# Patient Record
Sex: MALE | Race: WHITE | Employment: OTHER | URBAN - METROPOLITAN AREA
[De-identification: names, ages, dates, MRNs, and addresses within clinical notes are randomized per-mention and may not be internally consistent; named-entity substitution may affect disease eponyms.]

---

## 2023-02-15 ENCOUNTER — OFFICE VISIT (OUTPATIENT)
Dept: INTERNAL MEDICINE | Age: 78
End: 2023-02-15
Payer: MEDICARE

## 2023-02-15 VITALS
RESPIRATION RATE: 16 BRPM | HEIGHT: 63 IN | SYSTOLIC BLOOD PRESSURE: 150 MMHG | HEART RATE: 74 BPM | WEIGHT: 147.2 LBS | OXYGEN SATURATION: 96 % | BODY MASS INDEX: 26.08 KG/M2 | DIASTOLIC BLOOD PRESSURE: 78 MMHG

## 2023-02-15 DIAGNOSIS — R97.20 ELEVATED PSA: ICD-10-CM

## 2023-02-15 DIAGNOSIS — R33.9 URINARY RETENTION: ICD-10-CM

## 2023-02-15 DIAGNOSIS — E87.5 HYPERKALEMIA: ICD-10-CM

## 2023-02-15 DIAGNOSIS — Z87.891 PERSONAL HISTORY OF TOBACCO USE: ICD-10-CM

## 2023-02-15 DIAGNOSIS — I10 PRIMARY HYPERTENSION: Primary | ICD-10-CM

## 2023-02-15 DIAGNOSIS — Z91.81 AT HIGH RISK FOR FALLS: ICD-10-CM

## 2023-02-15 DIAGNOSIS — Z11.59 NEED FOR HEPATITIS C SCREENING TEST: ICD-10-CM

## 2023-02-15 DIAGNOSIS — E78.49 OTHER HYPERLIPIDEMIA: ICD-10-CM

## 2023-02-15 PROBLEM — H16.143 PUNCTATE KERATITIS, BILATERAL: Status: ACTIVE | Noted: 2018-11-27

## 2023-02-15 PROBLEM — N18.30 CKD (CHRONIC KIDNEY DISEASE) STAGE 3, GFR 30-59 ML/MIN (HCC): Status: RESOLVED | Noted: 2019-05-24 | Resolved: 2023-02-15

## 2023-02-15 PROBLEM — H02.88A MEIBOMIAN GLAND DYSFUNCTION (MGD) OF UPPER AND LOWER LIDS OF BOTH EYES: Status: ACTIVE | Noted: 2018-11-27

## 2023-02-15 PROBLEM — H52.03 HYPEROPIA OF BOTH EYES WITH ASTIGMATISM AND PRESBYOPIA: Status: RESOLVED | Noted: 2018-11-27 | Resolved: 2023-02-15

## 2023-02-15 PROBLEM — H25.813 COMBINED FORMS OF AGE-RELATED CATARACT OF BOTH EYES: Status: ACTIVE | Noted: 2018-11-27

## 2023-02-15 PROBLEM — H52.203 HYPEROPIA OF BOTH EYES WITH ASTIGMATISM AND PRESBYOPIA: Status: ACTIVE | Noted: 2018-11-27

## 2023-02-15 PROBLEM — H52.4 HYPEROPIA OF BOTH EYES WITH ASTIGMATISM AND PRESBYOPIA: Status: RESOLVED | Noted: 2018-11-27 | Resolved: 2023-02-15

## 2023-02-15 PROBLEM — H02.88B MEIBOMIAN GLAND DYSFUNCTION (MGD) OF UPPER AND LOWER LIDS OF BOTH EYES: Status: RESOLVED | Noted: 2018-11-27 | Resolved: 2023-02-15

## 2023-02-15 PROBLEM — N18.30 CKD (CHRONIC KIDNEY DISEASE) STAGE 3, GFR 30-59 ML/MIN (HCC): Status: ACTIVE | Noted: 2019-05-24

## 2023-02-15 PROBLEM — H02.88B MEIBOMIAN GLAND DYSFUNCTION (MGD) OF UPPER AND LOWER LIDS OF BOTH EYES: Status: ACTIVE | Noted: 2018-11-27

## 2023-02-15 PROBLEM — H52.203 HYPEROPIA OF BOTH EYES WITH ASTIGMATISM AND PRESBYOPIA: Status: RESOLVED | Noted: 2018-11-27 | Resolved: 2023-02-15

## 2023-02-15 PROBLEM — H16.143 PUNCTATE KERATITIS, BILATERAL: Status: RESOLVED | Noted: 2018-11-27 | Resolved: 2023-02-15

## 2023-02-15 PROBLEM — H52.4 HYPEROPIA OF BOTH EYES WITH ASTIGMATISM AND PRESBYOPIA: Status: ACTIVE | Noted: 2018-11-27

## 2023-02-15 PROBLEM — E78.5 HYPERLIPIDEMIA: Status: ACTIVE | Noted: 2018-04-24

## 2023-02-15 PROBLEM — H52.03 HYPEROPIA OF BOTH EYES WITH ASTIGMATISM AND PRESBYOPIA: Status: ACTIVE | Noted: 2018-11-27

## 2023-02-15 PROBLEM — H02.88A MEIBOMIAN GLAND DYSFUNCTION (MGD) OF UPPER AND LOWER LIDS OF BOTH EYES: Status: RESOLVED | Noted: 2018-11-27 | Resolved: 2023-02-15

## 2023-02-15 LAB
ANION GAP SERPL CALCULATED.3IONS-SCNC: 12 MEQ/L (ref 9–15)
BUN BLDV-MCNC: 26 MG/DL (ref 8–23)
CALCIUM SERPL-MCNC: 9.7 MG/DL (ref 8.5–9.9)
CHLORIDE BLD-SCNC: 104 MEQ/L (ref 95–107)
CO2: 20 MEQ/L (ref 20–31)
CREAT SERPL-MCNC: 1.29 MG/DL (ref 0.7–1.2)
GFR SERPL CREATININE-BSD FRML MDRD: 56.5 ML/MIN/{1.73_M2}
GLUCOSE BLD-MCNC: 80 MG/DL (ref 70–99)
POTASSIUM SERPL-SCNC: 5.2 MEQ/L (ref 3.4–4.9)
SODIUM BLD-SCNC: 136 MEQ/L (ref 135–144)

## 2023-02-15 PROCEDURE — G8417 CALC BMI ABV UP PARAM F/U: HCPCS | Performed by: NURSE PRACTITIONER

## 2023-02-15 PROCEDURE — 1123F ACP DISCUSS/DSCN MKR DOCD: CPT | Performed by: NURSE PRACTITIONER

## 2023-02-15 PROCEDURE — 99204 OFFICE O/P NEW MOD 45 MIN: CPT | Performed by: NURSE PRACTITIONER

## 2023-02-15 PROCEDURE — G8427 DOCREV CUR MEDS BY ELIG CLIN: HCPCS | Performed by: NURSE PRACTITIONER

## 2023-02-15 PROCEDURE — 4004F PT TOBACCO SCREEN RCVD TLK: CPT | Performed by: NURSE PRACTITIONER

## 2023-02-15 PROCEDURE — 3077F SYST BP >= 140 MM HG: CPT | Performed by: NURSE PRACTITIONER

## 2023-02-15 PROCEDURE — 3078F DIAST BP <80 MM HG: CPT | Performed by: NURSE PRACTITIONER

## 2023-02-15 PROCEDURE — G8484 FLU IMMUNIZE NO ADMIN: HCPCS | Performed by: NURSE PRACTITIONER

## 2023-02-15 RX ORDER — AMLODIPINE BESYLATE 5 MG/1
TABLET ORAL
COMMUNITY
Start: 2023-02-08 | End: 2023-02-15 | Stop reason: SDUPTHER

## 2023-02-15 RX ORDER — ATORVASTATIN CALCIUM 40 MG/1
TABLET, FILM COATED ORAL
COMMUNITY
Start: 2023-02-08

## 2023-02-15 RX ORDER — AMLODIPINE BESYLATE 10 MG/1
TABLET ORAL
Qty: 90 TABLET | Refills: 1 | Status: SHIPPED | OUTPATIENT
Start: 2023-02-15

## 2023-02-15 RX ORDER — ASPIRIN 81 MG/1
81 TABLET ORAL DAILY
COMMUNITY

## 2023-02-15 SDOH — ECONOMIC STABILITY: FOOD INSECURITY: WITHIN THE PAST 12 MONTHS, THE FOOD YOU BOUGHT JUST DIDN'T LAST AND YOU DIDN'T HAVE MONEY TO GET MORE.: NEVER TRUE

## 2023-02-15 SDOH — ECONOMIC STABILITY: HOUSING INSECURITY
IN THE LAST 12 MONTHS, WAS THERE A TIME WHEN YOU DID NOT HAVE A STEADY PLACE TO SLEEP OR SLEPT IN A SHELTER (INCLUDING NOW)?: NO

## 2023-02-15 SDOH — ECONOMIC STABILITY: FOOD INSECURITY: WITHIN THE PAST 12 MONTHS, YOU WORRIED THAT YOUR FOOD WOULD RUN OUT BEFORE YOU GOT MONEY TO BUY MORE.: NEVER TRUE

## 2023-02-15 SDOH — ECONOMIC STABILITY: INCOME INSECURITY: HOW HARD IS IT FOR YOU TO PAY FOR THE VERY BASICS LIKE FOOD, HOUSING, MEDICAL CARE, AND HEATING?: NOT HARD AT ALL

## 2023-02-15 ASSESSMENT — ENCOUNTER SYMPTOMS
BLOOD IN STOOL: 0
SHORTNESS OF BREATH: 0
WHEEZING: 0
CHEST TIGHTNESS: 0
RESPIRATORY NEGATIVE: 1

## 2023-02-15 ASSESSMENT — PATIENT HEALTH QUESTIONNAIRE - PHQ9
SUM OF ALL RESPONSES TO PHQ QUESTIONS 1-9: 1
SUM OF ALL RESPONSES TO PHQ9 QUESTIONS 1 & 2: 1
2. FEELING DOWN, DEPRESSED OR HOPELESS: 0
1. LITTLE INTEREST OR PLEASURE IN DOING THINGS: 1
SUM OF ALL RESPONSES TO PHQ QUESTIONS 1-9: 1

## 2023-02-15 NOTE — PROGRESS NOTES
[address] [id number] [facility] PRIMARY CARE  [address] [facility] [address]  Dept: [phone]  Dept Fax: [phone]: [phone]     FOREST Browne (: 1945) is a 65 y.o. male, New patient, here for evaluation of the following chief complaint(s):  New Patient (Here to establish. Was seeing Eneida CHAVARRIA in [facility] but it was too far. ), Other (Eneida CHAVARRIA was concerned for hyperkalemia. Wants BMP.), and Urinary Retention (Sometimes having a hard time going. Sometimes is up multiple times a night going to the bathroom. Tanika wanted him to follow up with a urologist. )      PCP:  No primary care provider on file. Here today to establish. Has elevated prostate level. His urination has been irregular. Gets up at least once a night, sometimes more. Seems like if works harder during day, able to sleep through it. His flow and ability to pee are abnormal. Has not seen urology. Hypertension: Pt checks his bp at home, generally is 135/70. DBP is pretty stable. SBP can get as high as 140, 150. Has been checking bp more lately but frequency varies. Never sees 120s SBP. Takes amlodipine 5mg daily. Is a smoker. Ofelia Gordon had a lung scan few years ago at [facility], thinks might have been an Xray but not sure. Review of Systems   Constitutional: Negative. Negative for fatigue and unexpected weight change. Respiratory: Negative. Negative for chest tightness, shortness of breath and wheezing. Cardiovascular: Negative. Negative for chest pain, palpitations and leg swelling. Gastrointestinal:  Negative for blood in stool. Genitourinary:  Positive for difficulty urinating and frequency. Negative for dysuria, flank pain and hematuria. Nocturia, hesitancy   Neurological:  Negative for weakness and light-headedness. Psychiatric/Behavioral: Negative. Negative for dysphoric mood.       Past Medical History:   Diagnosis Date    CKD (chronic kidney disease) stage 3, GFR 30-59 ml/min ([facility]) 5/24/2019    Heart damage     High cholesterol     Hyperopia of both eyes with astigmatism and presbyopia 11/27/2018    Hypertension     Meibomian gland dysfunction (MGD) of upper and lower lids of both eyes 11/27/2018    Punctate keratitis, bilateral 11/27/2018    Sciatica of right side      Past Surgical History:   Procedure Laterality Date    CYST REMOVAL       Social History     Socioeconomic History    Marital status: Not on file     Spouse name: Not on file    Number of children: Not on file    Years of education: Not on file    Highest education level: Not on file   Occupational History    Not on file   Tobacco Use    Smoking status: Every Day     Packs/day: 1.00     Years: 58.00     Pack years: 58.00     Types: Cigarettes     Start date: 1965    Smokeless tobacco: Never    Tobacco comments:     Says he doesn't really inhale   Substance and Sexual Activity    Alcohol use: Yes     Alcohol/week: 1.0 standard drink     Types: 1 Cans of beer per week     Comment: occassional    Drug use: Never    Sexual activity: Not on file   Other Topics Concern    Not on file   Social History Narrative    Not on file     Social Determinants of Health     Financial Resource Strain: Low Risk     Difficulty of Paying Living Expenses: Not hard at all   Food Insecurity: No Food Insecurity    Worried About [address] in the Last Year: Never true    [address] in the Last Year: Never true   Transportation Needs: Unknown    Lack of Transportation (Medical): Not on file    Lack of Transportation (Non-Medical):  No   Physical Activity: Not on file   Stress: Not on file   Social Connections: Not on file   Intimate Partner Violence: Not on file   Housing Stability: Unknown    Unable to Pay for Housing in the Last Year: Not on file    Number of Places Lived in the Last Year: Not on file    Unstable Housing in the Last Year: No Family History   Problem Relation Age of Onset    Coronary Art Dis Paternal Grandfather       No Known Allergies  Prior to Admission medications    Medication Sig Start Date End Date Taking? Authorizing Provider   aspirin 81 MG EC tablet Take 81 mg by mouth daily   Yes Historical Provider, MD   atorvastatin (LIPITOR) 40 MG tablet TAKE 1 TABLET ONCE DAILY 2/8/23  Yes Historical Provider, MD   amLODIPine (NORVASC) 10 MG tablet TAKE 1 TABLET ONCE DAILY 2/15/23  Yes ROBERT Castorena - MAYUR                I have reviewed the patient's medical history in detail and updated the computerized patient record. OBJECTIVE    Vitals:    02/15/23 1018 02/15/23 1043   BP: (!) 168/76 (!) 150/78   Site: Left Upper Arm    Position: Sitting    Cuff Size: Small Adult    Pulse: 74    Resp: 16    SpO2: 96%    Weight: 147 lb 3.2 oz (66.8 kg)    Height: 5' 2.6\" (1.59 m)        Physical Exam  Vitals and nursing note reviewed. Constitutional:       General: He is not in acute distress. Appearance: He is well-developed. HENT:      Head: Normocephalic and atraumatic. Comments: Very [place]  Neck:      Thyroid: No thyromegaly. Cardiovascular:      Rate and Rhythm: Normal rate and regular rhythm. Heart sounds: Normal heart sounds. No murmur heard. Pulmonary:      Effort: Pulmonary effort is normal. No respiratory distress. Breath sounds: Normal breath sounds. No wheezing. Musculoskeletal:      Cervical back: Normal range of motion. Lymphadenopathy:      Cervical: No cervical adenopathy. Skin:     General: Skin is warm and dry. Neurological:      Mental Status: He is alert and oriented to person, place, and time. Psychiatric:         Mood and Affect: Mood normal.         Behavior: Behavior normal.         ASSESSMENT/ PLAN    1.  Primary hypertension  Chronic, uncontrolled in office but home readings better- still 135-140s/70 regularly at home per his report  -did review his prev PCP progress notes thru external source which confirms this as well and had similar bp in office there  -discussed recommending he increase amlodipine to 10mg daily and watch home readings, really want those in closer to 120 sbp which he reports has not ever seen at home even  - amLODIPine (NORVASC) 10 MG tablet; TAKE 1 TABLET ONCE DAILY  Dispense: 90 tablet; Refill: 1    2. At high risk for falls  Fall Risk Assessment reviewed with abnormal findings. Encouraged patient to use cane / walker for balance and support. Discussed referral to PT for strengthening and/or balance needs. Plan of care reviewed with patient: Upon standing up, do not start ambulating quickly and be aware of \"feeling dizzy\" which could increase fall risk. Patient to be aware of surroundings at all times. Make sure proper lighting is on, do not ambulate at night in the dark. Remove area rugs that are in walkway to prevent tripping. Always use caution with stairs, check banister railing for sturdiness. Fall safety and prevention handout provided and reviewed with patient. Voiced undertanding with importance of keeping house clear of clutter in walkway and/or stairs. Will continue to monitor during visits. 3. Hyperkalemia  New on recent lab draw last week at [facility], K+5.7. checked CMP from 2022 and was wnl at that time. Not taking new meds. -recheck now  - Basic Metabolic Panel; Future    4. Other hyperlipidemia  Chronic, recent lipid panel thru external source reviewed and doing well on statin. conitnue    5. Elevated PSA  -chronic, climbing  -see was elev when done at [facility] last week, can see last few consecutive yrs on this result and has climbed every yr since 2020 (which is as far as can see back)  - Paulette Camargo MD, Urology, [place]    6. Urinary retention  Chronic, recommend urology to evaluate especially given the elev psa level  - Paulette Camargo MD, Urology, [place]    7.  Personal history of tobacco use  - CT chest diagnostic low dose; Future          Return in 3 months (on 5/15/2023) for Medicare wellness visit and bp recheck. Electronically signed by:  ROBERT Khan CNP   2/15/23    On the basis of positive falls risk screening, assessment and plan is as follows: home safety tips provided.

## 2023-02-15 NOTE — PATIENT INSTRUCTIONS
-Increase amlodipine to 10mg, so can take 2 tablets of your 5mg tablets at same time until gone.    -Please keep a blood pressure log, write it down and bring to next appt

## 2023-02-15 NOTE — Clinical Note
Had Hep C drawn 9/14/2020 at [facility] which was negative.  Please update health maintenance

## 2023-02-16 ENCOUNTER — TELEPHONE (OUTPATIENT)
Dept: INTERNAL MEDICINE | Age: 78
End: 2023-02-16

## 2023-02-16 NOTE — TELEPHONE ENCOUNTER
Please let him know labs came back showing potassium is still a little high, but not as bad as was when checked at [facility]. He is not on any meds that would contribute to this and since really is a first time thing in last 2 weeks,  I think for now we can just check it periodically to make sure not creeping again.

## 2023-02-20 ENCOUNTER — TELEPHONE (OUTPATIENT)
Dept: INTERNAL MEDICINE | Age: 78
End: 2023-02-20

## 2023-02-20 NOTE — TELEPHONE ENCOUNTER
Pt needs lung screen or CT chest w/o contrast before the CT chest diagnostic low dose can be scheduled

## 2023-03-09 LAB
ALBUMIN SERPL-MCNC: 4.8 G/DL
ALP BLD-CCNC: 97 U/L
ALT SERPL-CCNC: 18 U/L
ANION GAP SERPL CALCULATED.3IONS-SCNC: 10 MMOL/L
AST SERPL-CCNC: 24 U/L
AVERAGE GLUCOSE: 114
BASOPHILS ABSOLUTE: NORMAL
BASOPHILS RELATIVE PERCENT: NORMAL
BILIRUB SERPL-MCNC: 0.5 MG/DL (ref 0.1–1.4)
BUN BLDV-MCNC: 17 MG/DL
CALCIUM SERPL-MCNC: 10 MG/DL
CHLORIDE BLD-SCNC: 107 MMOL/L
CHOLESTEROL, TOTAL: 167 MG/DL
CHOLESTEROL/HDL RATIO: 0.97
CO2: 26 MMOL/L
CREAT SERPL-MCNC: 1.19 MG/DL
EGFR: 63
EOSINOPHILS ABSOLUTE: NORMAL
EOSINOPHILS RELATIVE PERCENT: NORMAL
GLUCOSE BLD-MCNC: 115 MG/DL
HBA1C MFR BLD: 5.6 %
HCT VFR BLD CALC: 42.3 % (ref 41–53)
HDLC SERPL-MCNC: 79 MG/DL (ref 35–70)
HEMOGLOBIN: 13.7 G/DL (ref 13.5–17.5)
LDL CHOLESTEROL CALCULATED: 77 MG/DL (ref 0–160)
LYMPHOCYTES ABSOLUTE: NORMAL
LYMPHOCYTES RELATIVE PERCENT: NORMAL
MCH RBC QN AUTO: 28.3 PG
MCHC RBC AUTO-ENTMCNC: 32.4 G/DL
MCV RBC AUTO: 87.4 FL
MONOCYTES ABSOLUTE: NORMAL
MONOCYTES RELATIVE PERCENT: NORMAL
NEUTROPHILS ABSOLUTE: NORMAL
NEUTROPHILS RELATIVE PERCENT: NORMAL
NONHDLC SERPL-MCNC: 88 MG/DL
PDW BLD-RTO: 14.7 %
PHOSPHORUS: 3.3 MG/DL
PLATELET # BLD: 315 K/ΜL
PMV BLD AUTO: 10.2 FL
POTASSIUM SERPL-SCNC: 5.7 MMOL/L
PROSTATE SPECIFIC ANTIGEN: 11.17 NG/ML
RBC # BLD: 4.84 10^6/ΜL
SODIUM BLD-SCNC: 143 MMOL/L
TOTAL PROTEIN: 7.6
TRIGL SERPL-MCNC: 56 MG/DL
VLDLC SERPL CALC-MCNC: 11 MG/DL
WBC # BLD: 5.1 10^3/ML

## 2023-03-23 ENCOUNTER — OFFICE VISIT (OUTPATIENT)
Dept: UROLOGY | Age: 78
End: 2023-03-23
Payer: MEDICARE

## 2023-03-23 VITALS
BODY MASS INDEX: 26.68 KG/M2 | WEIGHT: 145 LBS | HEIGHT: 62 IN | SYSTOLIC BLOOD PRESSURE: 132 MMHG | HEART RATE: 86 BPM | DIASTOLIC BLOOD PRESSURE: 72 MMHG

## 2023-03-23 DIAGNOSIS — R35.1 NOCTURIA: ICD-10-CM

## 2023-03-23 DIAGNOSIS — R97.20 ELEVATED PSA: Primary | ICD-10-CM

## 2023-03-23 DIAGNOSIS — R35.0 FREQUENCY OF URINATION: ICD-10-CM

## 2023-03-23 LAB
BILIRUBIN, POC: NORMAL
BLOOD URINE, POC: NORMAL
CLARITY, POC: CLEAR
COLOR, POC: YELLOW
GLUCOSE URINE, POC: NORMAL
KETONES, POC: NORMAL
LEUKOCYTE EST, POC: NORMAL
NITRITE, POC: NORMAL
PH, POC: 5.5
PROTEIN, POC: NORMAL
SPECIFIC GRAVITY, POC: 1.01
UROBILINOGEN, POC: 0.2

## 2023-03-23 PROCEDURE — 99213 OFFICE O/P EST LOW 20 MIN: CPT | Performed by: PHYSICIAN ASSISTANT

## 2023-03-23 PROCEDURE — 3078F DIAST BP <80 MM HG: CPT | Performed by: PHYSICIAN ASSISTANT

## 2023-03-23 PROCEDURE — 1123F ACP DISCUSS/DSCN MKR DOCD: CPT | Performed by: PHYSICIAN ASSISTANT

## 2023-03-23 PROCEDURE — 81003 URINALYSIS AUTO W/O SCOPE: CPT | Performed by: PHYSICIAN ASSISTANT

## 2023-03-23 PROCEDURE — G8417 CALC BMI ABV UP PARAM F/U: HCPCS | Performed by: PHYSICIAN ASSISTANT

## 2023-03-23 PROCEDURE — 4004F PT TOBACCO SCREEN RCVD TLK: CPT | Performed by: PHYSICIAN ASSISTANT

## 2023-03-23 PROCEDURE — G8427 DOCREV CUR MEDS BY ELIG CLIN: HCPCS | Performed by: PHYSICIAN ASSISTANT

## 2023-03-23 PROCEDURE — G8484 FLU IMMUNIZE NO ADMIN: HCPCS | Performed by: PHYSICIAN ASSISTANT

## 2023-03-23 PROCEDURE — 3075F SYST BP GE 130 - 139MM HG: CPT | Performed by: PHYSICIAN ASSISTANT

## 2023-03-23 RX ORDER — TAMSULOSIN HYDROCHLORIDE 0.4 MG/1
0.4 CAPSULE ORAL DAILY
Qty: 30 CAPSULE | Refills: 5 | Status: SHIPPED | OUTPATIENT
Start: 2023-03-23

## 2023-03-23 RX ORDER — DOXYCYCLINE HYCLATE 100 MG
100 TABLET ORAL 2 TIMES DAILY
Qty: 60 TABLET | Refills: 0 | Status: SHIPPED | OUTPATIENT
Start: 2023-03-23 | End: 2023-04-22

## 2023-03-23 ASSESSMENT — PATIENT HEALTH QUESTIONNAIRE - PHQ9
1. LITTLE INTEREST OR PLEASURE IN DOING THINGS: 0
SUM OF ALL RESPONSES TO PHQ QUESTIONS 1-9: 0
SUM OF ALL RESPONSES TO PHQ9 QUESTIONS 1 & 2: 0
SUM OF ALL RESPONSES TO PHQ QUESTIONS 1-9: 0
2. FEELING DOWN, DEPRESSED OR HOPELESS: 0

## 2023-03-23 ASSESSMENT — ENCOUNTER SYMPTOMS
VOMITING: 0
SINUS PRESSURE: 0
TROUBLE SWALLOWING: 0
CHEST TIGHTNESS: 0
SHORTNESS OF BREATH: 0
DIARRHEA: 0
BACK PAIN: 0
ABDOMINAL PAIN: 0
COUGH: 0

## 2023-03-23 NOTE — PROGRESS NOTES
Chaperone for Intimate Exam    1. Was chaperone offered as part of the rooming process? offered, declined   2. If Chaperone is declined by patient, NA: chaperone was available and exam completed  3.  Chaperone is N/A
visit:    Elevated PSA  -     POCT Urinalysis No Micro (Auto)  -     PSA Screening; Future  -     doxycycline hyclate (VIBRA-TABS) 100 MG tablet; Take 1 tablet by mouth 2 times daily    Nocturia  -     tamsulosin (FLOMAX) 0.4 MG capsule; Take 1 capsule by mouth daily  -     doxycycline hyclate (VIBRA-TABS) 100 MG tablet; Take 1 tablet by mouth 2 times daily    Frequency of urination  -     tamsulosin (FLOMAX) 0.4 MG capsule; Take 1 capsule by mouth daily  -     doxycycline hyclate (VIBRA-TABS) 100 MG tablet; Take 1 tablet by mouth 2 times daily    Orders Placed This Encounter   Procedures    PSA Screening     Standing Status:   Future     Standing Expiration Date:   3/23/2024    POCT Urinalysis No Micro (Auto)     Orders Placed This Encounter   Medications    tamsulosin (FLOMAX) 0.4 MG capsule     Sig: Take 1 capsule by mouth daily     Dispense:  30 capsule     Refill:  5    doxycycline hyclate (VIBRA-TABS) 100 MG tablet     Sig: Take 1 tablet by mouth 2 times daily     Dispense:  60 tablet     Refill:  0     There are no discontinued medications. Return in about 1 month (around 4/23/2023). Reviewed with the patient/family: current clinical status & medications. Side effects of the medication prescribed today, as well as treatment plan/rationale and result expectations have been discussed with the patient/family who expresses understanding. Patient will be discharged home in stable condition. Follow up with PCP to evaluate treatment results or return if symptoms worsen or fail to improve. Discussed signs and symptoms which require immediate follow-up in ED/call to 911. Understanding verbalized. I have reviewed the patient's medical history in detail and updated the computerized patient record.     DEON Savage

## 2023-04-24 ENCOUNTER — OFFICE VISIT (OUTPATIENT)
Dept: UROLOGY | Age: 78
End: 2023-04-24
Payer: MEDICARE

## 2023-04-24 VITALS
HEIGHT: 62 IN | DIASTOLIC BLOOD PRESSURE: 84 MMHG | WEIGHT: 145 LBS | BODY MASS INDEX: 26.68 KG/M2 | SYSTOLIC BLOOD PRESSURE: 138 MMHG | HEART RATE: 88 BPM

## 2023-04-24 DIAGNOSIS — R35.0 FREQUENCY OF URINATION: Primary | ICD-10-CM

## 2023-04-24 DIAGNOSIS — R97.20 ELEVATED PSA: ICD-10-CM

## 2023-04-24 LAB
BILIRUBIN, POC: NORMAL
BLOOD URINE, POC: NORMAL
CLARITY, POC: CLEAR
COLOR, POC: YELLOW
GLUCOSE URINE, POC: NORMAL
KETONES, POC: NORMAL
LEUKOCYTE EST, POC: NORMAL
NITRITE, POC: NORMAL
PH, POC: 5.5
PROTEIN, POC: NORMAL
SPECIFIC GRAVITY, POC: <1.005
UROBILINOGEN, POC: 0.2

## 2023-04-24 PROCEDURE — 3075F SYST BP GE 130 - 139MM HG: CPT | Performed by: PHYSICIAN ASSISTANT

## 2023-04-24 PROCEDURE — 99213 OFFICE O/P EST LOW 20 MIN: CPT | Performed by: PHYSICIAN ASSISTANT

## 2023-04-24 PROCEDURE — 81003 URINALYSIS AUTO W/O SCOPE: CPT | Performed by: PHYSICIAN ASSISTANT

## 2023-04-24 PROCEDURE — G8427 DOCREV CUR MEDS BY ELIG CLIN: HCPCS | Performed by: PHYSICIAN ASSISTANT

## 2023-04-24 PROCEDURE — 1123F ACP DISCUSS/DSCN MKR DOCD: CPT | Performed by: PHYSICIAN ASSISTANT

## 2023-04-24 PROCEDURE — 3079F DIAST BP 80-89 MM HG: CPT | Performed by: PHYSICIAN ASSISTANT

## 2023-04-24 PROCEDURE — G8417 CALC BMI ABV UP PARAM F/U: HCPCS | Performed by: PHYSICIAN ASSISTANT

## 2023-04-24 PROCEDURE — 4004F PT TOBACCO SCREEN RCVD TLK: CPT | Performed by: PHYSICIAN ASSISTANT

## 2023-04-24 ASSESSMENT — PATIENT HEALTH QUESTIONNAIRE - PHQ9
SUM OF ALL RESPONSES TO PHQ QUESTIONS 1-9: 0
SUM OF ALL RESPONSES TO PHQ QUESTIONS 1-9: 0
SUM OF ALL RESPONSES TO PHQ9 QUESTIONS 1 & 2: 0
SUM OF ALL RESPONSES TO PHQ QUESTIONS 1-9: 0
1. LITTLE INTEREST OR PLEASURE IN DOING THINGS: 0
SUM OF ALL RESPONSES TO PHQ QUESTIONS 1-9: 0
2. FEELING DOWN, DEPRESSED OR HOPELESS: 0

## 2023-04-24 ASSESSMENT — ENCOUNTER SYMPTOMS
COUGH: 0
BACK PAIN: 0
VOMITING: 0
SHORTNESS OF BREATH: 0
SINUS PRESSURE: 0
ABDOMINAL PAIN: 0
TROUBLE SWALLOWING: 0
CHEST TIGHTNESS: 0
DIARRHEA: 0

## 2023-04-24 NOTE — PROGRESS NOTES
head: No submental adenopathy. Left side of head: No submental adenopathy. Skin:     General: Skin is warm and dry. Capillary Refill: Capillary refill takes less than 2 seconds. Coloration: Skin is not jaundiced or pale. Findings: No bruising, erythema, lesion or rash. Neurological:      General: No focal deficit present. Mental Status: He is alert and oriented to person, place, and time. Mental status is at baseline. Cranial Nerves: No cranial nerve deficit. Sensory: No sensory deficit. Motor: No weakness. Coordination: Coordination normal.      Deep Tendon Reflexes: Reflexes are normal and symmetric. Psychiatric:         Mood and Affect: Mood normal.         Behavior: Behavior normal. Behavior is cooperative. Thought Content: Thought content normal.         Judgment: Judgment normal.       Assessment:       Diagnosis Orders   1. Frequency of urination  POCT Urinalysis No Micro (Auto)    PSA Screening      2. Elevated PSA  PSA Screening        Results for POC orders placed in visit on 04/24/23   POCT Urinalysis No Micro (Auto)   Result Value Ref Range    Color, UA yellow     Clarity, UA clear     Glucose, UA POC neg     Bilirubin, UA neg     Ketones, UA neg     Spec Grav, UA <1.005     Blood, UA POC neg     pH, UA 5.5     Protein, UA POC neg     Urobilinogen, UA 0.2     Leukocytes, UA neg     Nitrite, UA neg       Plan:     Assessment & Plan   Ellen Jaime was seen today for follow-up. Diagnoses and all orders for this visit:    Frequency of urination  -     POCT Urinalysis No Micro (Auto)  -     PSA Screening; Future    Elevated PSA  -     PSA Screening; Future      Orders Placed This Encounter   Procedures    PSA Screening     Standing Status:   Future     Standing Expiration Date:   4/24/2024    POCT Urinalysis No Micro (Auto)     No orders of the defined types were placed in this encounter. There are no discontinued medications.   Return in about 1 month

## 2023-05-01 DIAGNOSIS — R35.0 FREQUENCY OF URINATION: ICD-10-CM

## 2023-05-01 DIAGNOSIS — R97.20 ELEVATED PSA: ICD-10-CM

## 2023-05-01 LAB — PSA SERPL-MCNC: 11.25 NG/ML (ref 0–4)

## 2023-05-04 ENCOUNTER — OFFICE VISIT (OUTPATIENT)
Dept: UROLOGY | Age: 78
End: 2023-05-04
Payer: MEDICARE

## 2023-05-04 VITALS
WEIGHT: 145 LBS | DIASTOLIC BLOOD PRESSURE: 82 MMHG | HEIGHT: 62 IN | BODY MASS INDEX: 26.68 KG/M2 | SYSTOLIC BLOOD PRESSURE: 120 MMHG | HEART RATE: 91 BPM

## 2023-05-04 DIAGNOSIS — R97.20 ELEVATED PSA: Primary | ICD-10-CM

## 2023-05-04 LAB
BILIRUBIN, POC: ABNORMAL
BLOOD URINE, POC: ABNORMAL
CLARITY, POC: CLEAR
COLOR, POC: YELLOW
GLUCOSE URINE, POC: ABNORMAL
KETONES, POC: ABNORMAL
LEUKOCYTE EST, POC: ABNORMAL
NITRITE, POC: ABNORMAL
PH, POC: 6
PROTEIN, POC: ABNORMAL
SPECIFIC GRAVITY, POC: 1.01
UROBILINOGEN, POC: 0.2

## 2023-05-04 PROCEDURE — 99214 OFFICE O/P EST MOD 30 MIN: CPT | Performed by: UROLOGY

## 2023-05-04 PROCEDURE — 81003 URINALYSIS AUTO W/O SCOPE: CPT | Performed by: UROLOGY

## 2023-05-04 PROCEDURE — 4004F PT TOBACCO SCREEN RCVD TLK: CPT | Performed by: UROLOGY

## 2023-05-04 PROCEDURE — 3074F SYST BP LT 130 MM HG: CPT | Performed by: UROLOGY

## 2023-05-04 PROCEDURE — G8427 DOCREV CUR MEDS BY ELIG CLIN: HCPCS | Performed by: UROLOGY

## 2023-05-04 PROCEDURE — 3079F DIAST BP 80-89 MM HG: CPT | Performed by: UROLOGY

## 2023-05-04 PROCEDURE — G8417 CALC BMI ABV UP PARAM F/U: HCPCS | Performed by: UROLOGY

## 2023-05-04 PROCEDURE — 1123F ACP DISCUSS/DSCN MKR DOCD: CPT | Performed by: UROLOGY

## 2023-05-04 ASSESSMENT — ENCOUNTER SYMPTOMS
ABDOMINAL DISTENTION: 0
ABDOMINAL PAIN: 0

## 2023-05-04 NOTE — PROGRESS NOTES
Subjective:      Patient ID: Rg Dempsey is a 77 y.o. male    HPI  This is a 76 yo male with CKD (Creat 1.29), HTN, and back after last seen by Sun Victoria for BPH and LUTs and was started on Flomax. Since last seen on 4/24/23, he reports that the Flomax has significantly helped his LUTs and is happy with the effect. He has no pain, no hematuria or dysuria. He has a long h/o elevated PSA's being managed at [facility] by Dr Steph Martinez in 2019. He had PSA in the 9-11 range and had prior biopsy showing ASAP followed by a prostate MRI that showed a susp lesion in the TZ and MRI fusion biopsy was recommended but never completed for unclear reasons per my review of the records and conversation wit the patient today. He has no other complaints and I reviewed the interval PSA. PV 68 cc by US and biopsy was neg for cancer 5/19 but ASAP x 2  MRI prostate 5/19: susp TZ lesion 1.7 cm, no LN or osseous lesions      Past Medical History:   Diagnosis Date    CKD (chronic kidney disease) stage 3, GFR 30-59 ml/min ([facility]) 5/24/2019    Heart damage     High cholesterol     Hyperopia of both eyes with astigmatism and presbyopia 11/27/2018    Hypertension     Meibomian gland dysfunction (MGD) of upper and lower lids of both eyes 11/27/2018    Punctate keratitis, bilateral 11/27/2018    Sciatica of right side      Past Surgical History:   Procedure Laterality Date    CYST REMOVAL       Social History     Socioeconomic History    Marital status:    Tobacco Use    Smoking status: Every Day     Packs/day: 1.00     Years: 58.00     Pack years: 58.00     Types: Cigarettes     Start date: 1965    Smokeless tobacco: Never    Tobacco comments:     Says he doesn't really inhale   Substance and Sexual Activity    Alcohol use:  Yes     Alcohol/week: 1.0 standard drink     Types: 1 Cans of beer per week     Comment: occassional    Drug use: Never     Social Determinants of Health     Financial Resource Strain: Low Risk     Difficulty of

## 2023-06-05 ENCOUNTER — APPOINTMENT (OUTPATIENT)
Dept: GENERAL RADIOLOGY | Age: 78
End: 2023-06-05
Payer: MEDICARE

## 2023-06-05 ENCOUNTER — HOSPITAL ENCOUNTER (EMERGENCY)
Age: 78
Discharge: HOME | End: 2023-06-05
Payer: MEDICARE

## 2023-06-05 VITALS
HEART RATE: 81 BPM | TEMPERATURE: 98.7 F | HEIGHT: 63 IN | OXYGEN SATURATION: 95 % | SYSTOLIC BLOOD PRESSURE: 156 MMHG | WEIGHT: 145 LBS | DIASTOLIC BLOOD PRESSURE: 81 MMHG | BODY MASS INDEX: 25.69 KG/M2 | RESPIRATION RATE: 16 BRPM

## 2023-06-05 DIAGNOSIS — S61.411A LACERATION OF RIGHT HAND WITHOUT FOREIGN BODY, INITIAL ENCOUNTER: Primary | ICD-10-CM

## 2023-06-05 PROCEDURE — 99283 EMERGENCY DEPT VISIT LOW MDM: CPT

## 2023-06-05 PROCEDURE — 12002 RPR S/N/AX/GEN/TRNK2.6-7.5CM: CPT

## 2023-06-05 PROCEDURE — 73130 X-RAY EXAM OF HAND: CPT

## 2023-06-05 RX ORDER — CEPHALEXIN 500 MG/1
500 CAPSULE ORAL 2 TIMES DAILY
Qty: 14 CAPSULE | Refills: 0 | Status: SHIPPED | OUTPATIENT
Start: 2023-06-05 | End: 2023-06-12

## 2023-06-05 RX ORDER — AMOXICILLIN AND CLAVULANATE POTASSIUM 200; 28.5 MG/5ML; MG/5ML
10 POWDER, FOR SUSPENSION ORAL ONCE
Status: DISCONTINUED | OUTPATIENT
Start: 2023-06-05 | End: 2023-06-05

## 2023-06-05 RX ORDER — HYDROCODONE BITARTRATE AND ACETAMINOPHEN 5; 325 MG/1; MG/1
1 TABLET ORAL ONCE
Status: DISCONTINUED | OUTPATIENT
Start: 2023-06-05 | End: 2023-06-06 | Stop reason: HOSPADM

## 2023-06-05 ASSESSMENT — ENCOUNTER SYMPTOMS
NAUSEA: 0
SHORTNESS OF BREATH: 0
CHOKING: 0
SORE THROAT: 0
RHINORRHEA: 0
DIARRHEA: 0
ABDOMINAL DISTENTION: 0
ABDOMINAL PAIN: 0
VOMITING: 0
COUGH: 0

## 2023-06-05 ASSESSMENT — PAIN SCALES - GENERAL: PAINLEVEL_OUTOF10: 3

## 2023-06-05 ASSESSMENT — LIFESTYLE VARIABLES
HOW MANY STANDARD DRINKS CONTAINING ALCOHOL DO YOU HAVE ON A TYPICAL DAY: PATIENT DOES NOT DRINK
HOW OFTEN DO YOU HAVE A DRINK CONTAINING ALCOHOL: NEVER

## 2023-06-05 ASSESSMENT — PAIN DESCRIPTION - LOCATION: LOCATION: HAND

## 2023-06-05 ASSESSMENT — PAIN DESCRIPTION - ORIENTATION: ORIENTATION: RIGHT

## 2023-06-06 NOTE — ED PROVIDER NOTES
[address] [facility] ED  eMERGENCY dEPARTMENT eNCOUnter      Pt Name: Cj Merrill  MRN: [id number]  [place] 1945  Date of evaluation: 6/5/2023  Provider: Jovanny Johnson PA-C        HISTORY OF PRESENT ILLNESS    Cj Merrill is a 77 y.o. male per chart review has a h/o HTN, HLD, CKD presents to the ED with complaints of right hand laceration PTA. Abrupt onset of constant, moderate, aching pain at site of the lac. Patient states he was changing a tire and it fell over the top of his R hand, endorses that there is a large skin tear. Not actively bleeding upon arrival.  They put powder on it to get it to stop bleeding. He is not on any blood thinners. Takes ASA daily. No other injury. REVIEW OF SYSTEMS       Review of Systems   Constitutional:  Negative for chills, fatigue and fever. HENT:  Negative for congestion, rhinorrhea, sneezing and sore throat. Respiratory:  Negative for cough, choking and shortness of breath. Cardiovascular:  Negative for chest pain. Gastrointestinal:  Negative for abdominal distention, abdominal pain, diarrhea, nausea and vomiting. Genitourinary:  Negative for dysuria, flank pain, hematuria and urgency. Skin:  Positive for wound. Except as noted above the remainder of the review of systems was reviewed and negative.        PAST MEDICAL HISTORY     Past Medical History:   Diagnosis Date    CKD (chronic kidney disease) stage 3, GFR 30-59 ml/min ([facility]) 5/24/2019    Heart damage     High cholesterol     Hyperopia of both eyes with astigmatism and presbyopia 11/27/2018    Hypertension     Meibomian gland dysfunction (MGD) of upper and lower lids of both eyes 11/27/2018    Punctate keratitis, bilateral 11/27/2018    Sciatica of right side          SURGICAL HISTORY       Past Surgical History:   Procedure Laterality Date    CYST REMOVAL           CURRENT MEDICATIONS       Discharge Medication List as of 6/5/2023 10:00 PM        CONTINUE these medications which have

## 2023-06-22 ENCOUNTER — OFFICE VISIT (OUTPATIENT)
Dept: FAMILY MEDICINE CLINIC | Age: 78
End: 2023-06-22
Payer: MEDICARE

## 2023-06-22 VITALS
DIASTOLIC BLOOD PRESSURE: 64 MMHG | SYSTOLIC BLOOD PRESSURE: 122 MMHG | WEIGHT: 154 LBS | HEART RATE: 89 BPM | TEMPERATURE: 98.4 F | OXYGEN SATURATION: 94 % | BODY MASS INDEX: 27.28 KG/M2

## 2023-06-22 DIAGNOSIS — T14.8XXA SKIN AVULSION: Primary | ICD-10-CM

## 2023-06-22 PROCEDURE — 4004F PT TOBACCO SCREEN RCVD TLK: CPT | Performed by: NURSE PRACTITIONER

## 2023-06-22 PROCEDURE — 3074F SYST BP LT 130 MM HG: CPT | Performed by: NURSE PRACTITIONER

## 2023-06-22 PROCEDURE — G8417 CALC BMI ABV UP PARAM F/U: HCPCS | Performed by: NURSE PRACTITIONER

## 2023-06-22 PROCEDURE — 99213 OFFICE O/P EST LOW 20 MIN: CPT | Performed by: NURSE PRACTITIONER

## 2023-06-22 PROCEDURE — 1123F ACP DISCUSS/DSCN MKR DOCD: CPT | Performed by: NURSE PRACTITIONER

## 2023-06-22 PROCEDURE — G8427 DOCREV CUR MEDS BY ELIG CLIN: HCPCS | Performed by: NURSE PRACTITIONER

## 2023-06-22 PROCEDURE — 3078F DIAST BP <80 MM HG: CPT | Performed by: NURSE PRACTITIONER

## 2023-06-22 ASSESSMENT — ENCOUNTER SYMPTOMS
FACIAL SWELLING: 0
COUGH: 0
CHEST TIGHTNESS: 0
WHEEZING: 0
SORE THROAT: 0
TROUBLE SWALLOWING: 0
SHORTNESS OF BREATH: 0

## 2023-06-22 NOTE — PROGRESS NOTES
Gonzalo Barreto (:  1945) is a 77 y.o. male, Established patient, here for evaluation of the following chief complaint(s): Wound Check (Wound on RT hand)      Vitals:    23 1340   BP: 122/64   Pulse: 89   Temp: 98.4 °F (36.9 °C)   SpO2: 94%       ASSESSMENT/PLAN:  1. Skin avulsion  -     mupirocin (BACTROBAN) 2 % ointment; Apply topically 3 times daily for 10 days. , Disp-30 g, R-0, Normal        -     avulsion is improving and healing. Informed pt will take possible month or two for skin to completely heal.        -     continue to clean with epsom salt or antibacterial soap 2-3x daily, use thin layer of antibiotic cream after cleaning and allowing to dry to prevent any infection to open area of skin        -    keep open to air, cover as needed    Return if symptoms worsen or fail to improve. SUBJECTIVE/OBJECTIVE:    Other  This is a new problem. Episode onset: pt here for wound check of the back of right hand (avulsin w/stitches on ; stitches removed 6/15) The problem has been unchanged. Pertinent negatives include no arthralgias, chest pain, chills, congestion, coughing, fatigue, fever, myalgias, neck pain, rash or sore throat. Treatments tried: antibiotic, salt water and antibacterial soaks, open to air. The treatment provided moderate relief. Review of Systems   Constitutional:  Negative for chills, fatigue and fever. HENT:  Negative for congestion, facial swelling, sore throat and trouble swallowing. Respiratory:  Negative for cough, chest tightness, shortness of breath and wheezing. Cardiovascular:  Negative for chest pain and palpitations. Musculoskeletal:  Negative for arthralgias, myalgias and neck pain. Skin:  Positive for wound. Negative for pallor and rash. Physical Exam  Vitals reviewed. Constitutional:       General: He is not in acute distress. Appearance: Normal appearance. HENT:      Mouth/Throat:      Lips: Pink.       Mouth: Mucous membranes

## 2023-09-26 ENCOUNTER — HOSPITAL ENCOUNTER (OUTPATIENT)
Dept: LAB | Age: 78
Discharge: HOME | End: 2023-09-26
Payer: MEDICARE

## 2023-09-26 DIAGNOSIS — R97.20 ELEVATED PSA: ICD-10-CM

## 2023-09-26 LAB — PSA SERPL-MCNC: 9.85 NG/ML (ref 0–4)

## 2023-09-26 PROCEDURE — 84153 ASSAY OF PSA TOTAL: CPT

## 2023-09-26 PROCEDURE — 36415 COLL VENOUS BLD VENIPUNCTURE: CPT

## 2023-10-03 DIAGNOSIS — R35.1 NOCTURIA: Primary | ICD-10-CM

## 2023-10-03 RX ORDER — TAMSULOSIN HYDROCHLORIDE 0.4 MG/1
0.4 CAPSULE ORAL DAILY
Qty: 90 CAPSULE | Refills: 3 | Status: SHIPPED | OUTPATIENT
Start: 2023-10-03

## 2023-10-30 ENCOUNTER — OFFICE VISIT (OUTPATIENT)
Dept: UROLOGY | Age: 78
End: 2023-10-30
Payer: MEDICARE

## 2023-10-30 VITALS
HEART RATE: 76 BPM | SYSTOLIC BLOOD PRESSURE: 138 MMHG | DIASTOLIC BLOOD PRESSURE: 62 MMHG | WEIGHT: 140 LBS | OXYGEN SATURATION: 96 % | BODY MASS INDEX: 24.8 KG/M2 | HEIGHT: 63 IN

## 2023-10-30 DIAGNOSIS — R97.20 ELEVATED PSA: Primary | ICD-10-CM

## 2023-10-30 PROCEDURE — G8484 FLU IMMUNIZE NO ADMIN: HCPCS | Performed by: UROLOGY

## 2023-10-30 PROCEDURE — 3075F SYST BP GE 130 - 139MM HG: CPT | Performed by: UROLOGY

## 2023-10-30 PROCEDURE — G8427 DOCREV CUR MEDS BY ELIG CLIN: HCPCS | Performed by: UROLOGY

## 2023-10-30 PROCEDURE — G8420 CALC BMI NORM PARAMETERS: HCPCS | Performed by: UROLOGY

## 2023-10-30 PROCEDURE — 99213 OFFICE O/P EST LOW 20 MIN: CPT | Performed by: UROLOGY

## 2023-10-30 PROCEDURE — 3078F DIAST BP <80 MM HG: CPT | Performed by: UROLOGY

## 2023-10-30 PROCEDURE — 1123F ACP DISCUSS/DSCN MKR DOCD: CPT | Performed by: UROLOGY

## 2023-10-30 PROCEDURE — 4004F PT TOBACCO SCREEN RCVD TLK: CPT | Performed by: UROLOGY

## 2023-10-30 ASSESSMENT — ENCOUNTER SYMPTOMS
ABDOMINAL PAIN: 0
ABDOMINAL DISTENTION: 0

## 2023-10-30 NOTE — PROGRESS NOTES
Subjective:      Patient ID: Connie Carey is a 77 y.o. male    HPI  This is a 76 yo male with CKD (Creat 1.29), HTN, BPH and LUTs on Flomax and long h/o elevated PSA's. Since last seen on 5/4/23, he still feels the Flomax is helping his LUTs. He has no SE and denies hematuria or dysuria or pain. I reviewed the interval PSA. He has a long h/o elevated PSA's being managed at [facility] by Dr Lulú Valdez in 2019. He had PSA in the 9-11 range and had prior biopsy showing ASAP followed by a prostate MRI that showed a susp lesion in the TZ and MRI fusion biopsy was recommended but never completed for unclear reasons per my prior review of the records . He has no other complaints. PV 68 cc by US and biopsy was neg for cancer 5/19 but ASAP x 2  MRI prostate 5/19: susp TZ lesion 1.7 cm, no LN or osseous lesions    Past Medical History:   Diagnosis Date    CKD (chronic kidney disease) stage 3, GFR 30-59 ml/min ([facility]) 5/24/2019    Heart damage     High cholesterol     Hyperopia of both eyes with astigmatism and presbyopia 11/27/2018    Hypertension     Meibomian gland dysfunction (MGD) of upper and lower lids of both eyes 11/27/2018    Punctate keratitis, bilateral 11/27/2018    Sciatica of right side      Past Surgical History:   Procedure Laterality Date    CYST REMOVAL       Social History     Socioeconomic History    Marital status:      Spouse name: None    Number of children: None    Years of education: None    Highest education level: None   Tobacco Use    Smoking status: Every Day     Packs/day: 1.00     Years: 58.00     Additional pack years: 0.00     Total pack years: 58.00     Types: Cigarettes     Start date: 1965    Smokeless tobacco: Never    Tobacco comments:     Says he doesn't really inhale   Substance and Sexual Activity    Alcohol use:  Yes     Alcohol/week: 1.0 standard drink of alcohol     Types: 1 Cans of beer per week     Comment: occassional    Drug use: Never     Social Determinants of Health

## 2023-11-28 DIAGNOSIS — I10 PRIMARY HYPERTENSION: ICD-10-CM

## 2023-11-29 RX ORDER — AMLODIPINE BESYLATE 10 MG/1
TABLET ORAL
Qty: 90 TABLET | Refills: 0 | Status: SHIPPED | OUTPATIENT
Start: 2023-11-29

## 2023-11-29 NOTE — TELEPHONE ENCOUNTER
Comments:     Last Office Visit (last PCP visit):   2/15/2023    Next Visit Date:  Future Appointments   Date Time Provider Department Center   5/1/2024  1:15 PM Homero Flores MD [place] [facility]       **If hasn't been seen in over a year OR hasn't followed up according to last diabetes/ADHD visit, make appointment for patient before sending refill to provider.    Rx requested:  Requested Prescriptions     Pending Prescriptions Disp Refills    amLODIPine (NORVASC) 10 MG tablet [Pharmacy Med Name: amlodipine 10 mg tablet] 90 tablet 1     Sig: TAKE 1 TABLET ONCE DAILY

## 2024-01-31 DIAGNOSIS — R35.1 NOCTURIA: ICD-10-CM

## 2024-01-31 DIAGNOSIS — I10 PRIMARY HYPERTENSION: ICD-10-CM

## 2024-01-31 DIAGNOSIS — I25.10 ATHEROSCLEROSIS OF NATIVE CORONARY ARTERY OF NATIVE HEART WITHOUT ANGINA PECTORIS: Primary | ICD-10-CM

## 2024-01-31 DIAGNOSIS — R35.0 FREQUENCY OF URINATION: ICD-10-CM

## 2024-01-31 RX ORDER — ATORVASTATIN CALCIUM 40 MG/1
40 TABLET, FILM COATED ORAL DAILY
Qty: 30 TABLET | Refills: 0 | Status: SHIPPED | OUTPATIENT
Start: 2024-01-31

## 2024-01-31 RX ORDER — TAMSULOSIN HYDROCHLORIDE 0.4 MG/1
0.4 CAPSULE ORAL DAILY
Qty: 30 CAPSULE | Refills: 0 | Status: SHIPPED | OUTPATIENT
Start: 2024-01-31

## 2024-01-31 RX ORDER — AMLODIPINE BESYLATE 10 MG/1
10 TABLET ORAL DAILY
Qty: 30 TABLET | Refills: 0 | Status: SHIPPED | OUTPATIENT
Start: 2024-01-31

## 2024-01-31 NOTE — TELEPHONE ENCOUNTER
Comments: Pt has annual scheduled with Arlin 2/19/2024, just needs these refilled while she is not in office    Last Office Visit (last PCP visit):   2/15/2023    Next Visit Date:  Future Appointments   Date Time Provider Department Center   1/31/2024 11:15 AM Raciel Palmer MD [facility]   2/19/2024  9:00 AM Arlin Armas APRN - CNP [facility] [place] [facility]   5/1/2024  1:15 PM Homero Flores MD [place] [facility]       **If hasn't been seen in over a year OR hasn't followed up according to last diabetes/ADHD visit, make appointment for patient before sending refill to provider.    Rx requested:  Requested Prescriptions     Pending Prescriptions Disp Refills    amLODIPine (NORVASC) 10 MG tablet 90 tablet 0     Sig: Take 1 tablet by mouth daily    tamsulosin (FLOMAX) 0.4 MG capsule 30 capsule 5     Sig: Take 1 capsule by mouth daily    atorvastatin (LIPITOR) 40 MG tablet 30 tablet      Sig: Take 1 tablet by mouth daily

## 2024-02-19 ENCOUNTER — TELEPHONE (OUTPATIENT)
Dept: INTERNAL MEDICINE | Age: 79
End: 2024-02-19

## 2024-02-19 ENCOUNTER — OFFICE VISIT (OUTPATIENT)
Dept: INTERNAL MEDICINE | Age: 79
End: 2024-02-19
Payer: MEDICARE

## 2024-02-19 VITALS
WEIGHT: 158 LBS | BODY MASS INDEX: 28 KG/M2 | RESPIRATION RATE: 16 BRPM | SYSTOLIC BLOOD PRESSURE: 150 MMHG | HEIGHT: 63 IN | HEART RATE: 81 BPM | OXYGEN SATURATION: 97 % | DIASTOLIC BLOOD PRESSURE: 72 MMHG

## 2024-02-19 DIAGNOSIS — N13.8 BPH WITH OBSTRUCTION/LOWER URINARY TRACT SYMPTOMS: ICD-10-CM

## 2024-02-19 DIAGNOSIS — I25.10 ATHEROSCLEROSIS OF NATIVE CORONARY ARTERY OF NATIVE HEART WITHOUT ANGINA PECTORIS: ICD-10-CM

## 2024-02-19 DIAGNOSIS — Z00.00 MEDICARE ANNUAL WELLNESS VISIT, SUBSEQUENT: Primary | ICD-10-CM

## 2024-02-19 DIAGNOSIS — R73.01 IMPAIRED FASTING GLUCOSE: Primary | ICD-10-CM

## 2024-02-19 DIAGNOSIS — I10 PRIMARY HYPERTENSION: ICD-10-CM

## 2024-02-19 DIAGNOSIS — Z13.1 SCREENING FOR DIABETES MELLITUS: ICD-10-CM

## 2024-02-19 DIAGNOSIS — N40.1 BPH WITH OBSTRUCTION/LOWER URINARY TRACT SYMPTOMS: ICD-10-CM

## 2024-02-19 DIAGNOSIS — E78.49 OTHER HYPERLIPIDEMIA: ICD-10-CM

## 2024-02-19 DIAGNOSIS — Z87.891 PERSONAL HISTORY OF TOBACCO USE: ICD-10-CM

## 2024-02-19 DIAGNOSIS — R97.20 ELEVATED PROSTATE SPECIFIC ANTIGEN (PSA): ICD-10-CM

## 2024-02-19 PROBLEM — R35.0 FREQUENCY OF URINATION: Status: RESOLVED | Noted: 2023-04-24 | Resolved: 2024-02-19

## 2024-02-19 LAB
ALBUMIN SERPL-MCNC: 4.6 G/DL (ref 3.5–4.6)
ALP SERPL-CCNC: 102 U/L (ref 35–104)
ALT SERPL-CCNC: 14 U/L (ref 0–41)
ANION GAP SERPL CALCULATED.3IONS-SCNC: 13 MEQ/L (ref 9–15)
AST SERPL-CCNC: 20 U/L (ref 0–40)
BILIRUB SERPL-MCNC: 0.6 MG/DL (ref 0.2–0.7)
BUN SERPL-MCNC: 20 MG/DL (ref 8–23)
CALCIUM SERPL-MCNC: 9.8 MG/DL (ref 8.5–9.9)
CHLORIDE SERPL-SCNC: 106 MEQ/L (ref 95–107)
CHOLEST SERPL-MCNC: 160 MG/DL (ref 0–199)
CO2 SERPL-SCNC: 22 MEQ/L (ref 20–31)
CREAT SERPL-MCNC: 1.19 MG/DL (ref 0.7–1.2)
GLOBULIN SER CALC-MCNC: 2.5 G/DL (ref 2.3–3.5)
GLUCOSE FASTING: 104 MG/DL (ref 70–99)
HDLC SERPL-MCNC: 82 MG/DL (ref 40–59)
LDL CHOLESTEROL CALCULATED: 69 MG/DL (ref 0–129)
POTASSIUM SERPL-SCNC: 4.6 MEQ/L (ref 3.4–4.9)
PROT SERPL-MCNC: 7.1 G/DL (ref 6.3–8)
SODIUM SERPL-SCNC: 141 MEQ/L (ref 135–144)
TRIGLYCERIDE, FASTING: 47 MG/DL (ref 0–150)

## 2024-02-19 PROCEDURE — G0296 VISIT TO DETERM LDCT ELIG: HCPCS | Performed by: NURSE PRACTITIONER

## 2024-02-19 PROCEDURE — 3077F SYST BP >= 140 MM HG: CPT | Performed by: NURSE PRACTITIONER

## 2024-02-19 PROCEDURE — 1123F ACP DISCUSS/DSCN MKR DOCD: CPT | Performed by: NURSE PRACTITIONER

## 2024-02-19 PROCEDURE — G0439 PPPS, SUBSEQ VISIT: HCPCS | Performed by: NURSE PRACTITIONER

## 2024-02-19 PROCEDURE — G8484 FLU IMMUNIZE NO ADMIN: HCPCS | Performed by: NURSE PRACTITIONER

## 2024-02-19 PROCEDURE — 3078F DIAST BP <80 MM HG: CPT | Performed by: NURSE PRACTITIONER

## 2024-02-19 RX ORDER — AMLODIPINE BESYLATE 10 MG/1
10 TABLET ORAL DAILY
Qty: 90 TABLET | Refills: 3 | Status: SHIPPED | OUTPATIENT
Start: 2024-02-19

## 2024-02-19 RX ORDER — TAMSULOSIN HYDROCHLORIDE 0.4 MG/1
0.4 CAPSULE ORAL DAILY
Qty: 90 CAPSULE | Refills: 3 | Status: SHIPPED | OUTPATIENT
Start: 2024-02-19

## 2024-02-19 RX ORDER — ATORVASTATIN CALCIUM 40 MG/1
40 TABLET, FILM COATED ORAL DAILY
Qty: 90 TABLET | Refills: 3 | Status: SHIPPED | OUTPATIENT
Start: 2024-02-19

## 2024-02-19 RX ORDER — ASPIRIN 81 MG/1
81 TABLET ORAL DAILY
Qty: 90 TABLET | Refills: 3 | Status: SHIPPED | OUTPATIENT
Start: 2024-02-19

## 2024-02-19 SDOH — ECONOMIC STABILITY: FOOD INSECURITY: WITHIN THE PAST 12 MONTHS, THE FOOD YOU BOUGHT JUST DIDN'T LAST AND YOU DIDN'T HAVE MONEY TO GET MORE.: NEVER TRUE

## 2024-02-19 SDOH — ECONOMIC STABILITY: FOOD INSECURITY: WITHIN THE PAST 12 MONTHS, YOU WORRIED THAT YOUR FOOD WOULD RUN OUT BEFORE YOU GOT MONEY TO BUY MORE.: NEVER TRUE

## 2024-02-19 SDOH — ECONOMIC STABILITY: INCOME INSECURITY: HOW HARD IS IT FOR YOU TO PAY FOR THE VERY BASICS LIKE FOOD, HOUSING, MEDICAL CARE, AND HEATING?: NOT HARD AT ALL

## 2024-02-19 ASSESSMENT — PATIENT HEALTH QUESTIONNAIRE - PHQ9
SUM OF ALL RESPONSES TO PHQ QUESTIONS 1-9: 0
1. LITTLE INTEREST OR PLEASURE IN DOING THINGS: 0
2. FEELING DOWN, DEPRESSED OR HOPELESS: 0
SUM OF ALL RESPONSES TO PHQ9 QUESTIONS 1 & 2: 0

## 2024-02-19 ASSESSMENT — LIFESTYLE VARIABLES
HOW OFTEN DO YOU HAVE A DRINK CONTAINING ALCOHOL: 2-4 TIMES A MONTH
HOW MANY STANDARD DRINKS CONTAINING ALCOHOL DO YOU HAVE ON A TYPICAL DAY: 1 OR 2

## 2024-02-19 NOTE — PATIENT INSTRUCTIONS
About Lung Cancer Screening.\"  Current as of: February 28, 2023               Content Version: 13.9  © 2006-2023 [email or web address].   Care instructions adapted under license by [employer]. If you have questions about a medical condition or this instruction, always ask your healthcare professional. [email or web address] disclaims any warranty or liability for your use of this information.           A Healthy Heart: Care Instructions  Your Care Instructions     Coronary artery disease, also called heart disease, occurs when a substance called plaque builds up in the vessels that supply oxygen-rich blood to your heart muscle. This can narrow the blood vessels and reduce blood flow. A heart attack happens when blood flow is completely blocked. A high-fat diet, smoking, and other factors increase the risk of heart disease.  Your doctor has found that you have a chance of having heart disease. You can do lots of things to keep your heart healthy. It may not be easy, but you can change your diet, exercise more, and quit smoking. These steps really work to lower your chance of heart disease.  Follow-up care is a key part of your treatment and safety. Be sure to make and go to all appointments, and call your doctor if you are having problems. It's also a good idea to know your test results and keep a list of the medicines you take.  How can you care for yourself at home?  Diet    Use less salt when you cook and eat. This helps lower your blood pressure. Taste food before salting. Add only a little salt when you think you need it. With time, your taste buds will adjust to less salt.     Eat fewer snack items, fast foods, canned soups, and other high-salt, high-fat, processed foods.     Read food labels and try to avoid saturated and trans fats. They increase your risk of heart disease by raising cholesterol levels.     Limit the amount of solid fat-butter, margarine, and shortening-you eat. Use olive, peanut, or

## 2024-02-19 NOTE — PROGRESS NOTES
Medicare Annual Wellness Visit    Thierry Lindquist is here for Medicare AWV and Skin Problem (Patient thinks he has an ingrown hair on his right cheek that is causing very minor pain. )    Assessment & Plan   Medicare annual wellness visit, subsequent     -AWV. Fasting labs ordered.       -reports a spot on right upper cheek that feels like a whisker pokes thru. Nothing abnormal seen today. Advised to watch for skin changes  Primary hypertension  -    chronic, remains uncontrolled despite increasing from 5mg to 10mg of amlodipine at visit last yr  -    he doesn't check bp often. Enc to add 2nd agent for bp control. He asks to monitor it at home more regularly and f/u in 6 months, if still running high at home, he would consider 2nd agent.  -     discussed risks of uncontrolled htn.   -     amLODIPine (NORVASC) 10 MG tablet; Take 1 tablet by mouth daily, Disp-90 tablet, R-3Normal  -     Comprehensive Metabolic Panel, Fasting; Future  Atherosclerosis of native coronary artery of native heart without angina pectoris  -    chronic, stable. On lipitor and aspirin. Bp not to goal. Discussed this risk, he wants to check at home d/t feels is higher here than home. Instructed to bring log next visit.   -labs.   -     atorvastatin (LIPITOR) 40 MG tablet; Take 1 tablet by mouth daily, Disp-90 tablet, R-3Normal  -     aspirin 81 MG EC tablet; Take 1 tablet by mouth daily, Disp-90 tablet, R-3Normal  -     Lipid, Fasting; Future  Other hyperlipidemia  -     Lipid, Fasting; Future  BPH with obstruction/lower urinary tract symptoms  -    chronic, stable on flomax. Not asymptomatic but symptoms are better with med.   -     tamsulosin (FLOMAX) 0.4 MG capsule; Take 1 capsule by mouth daily, Disp-90 capsule, R-3Normal  Elevated prostate specific antigen (PSA)      -chronic, declined MRI at visit with uro in Oct. They recommend close psa monitoring, every 6 months, next visit in may  Screening for diabetes mellitus  -     Comprehensive

## 2024-02-20 NOTE — TELEPHONE ENCOUNTER
Labs look good overall. Blood sugar just a few points elevated, so reduce intake of sugars. Otherwise all good and was just a little bit.

## 2024-05-20 ENCOUNTER — OFFICE VISIT (OUTPATIENT)
Dept: INTERNAL MEDICINE | Age: 79
End: 2024-05-20
Payer: MEDICARE

## 2024-05-20 ENCOUNTER — TELEPHONE (OUTPATIENT)
Dept: INTERNAL MEDICINE | Age: 79
End: 2024-05-20

## 2024-05-20 VITALS
TEMPERATURE: 97.9 F | SYSTOLIC BLOOD PRESSURE: 134 MMHG | DIASTOLIC BLOOD PRESSURE: 66 MMHG | HEART RATE: 91 BPM | WEIGHT: 150 LBS | HEIGHT: 63 IN | BODY MASS INDEX: 26.58 KG/M2 | OXYGEN SATURATION: 95 %

## 2024-05-20 DIAGNOSIS — N13.8 BPH WITH OBSTRUCTION/LOWER URINARY TRACT SYMPTOMS: ICD-10-CM

## 2024-05-20 DIAGNOSIS — I10 PRIMARY HYPERTENSION: ICD-10-CM

## 2024-05-20 DIAGNOSIS — R53.83 FATIGUE, UNSPECIFIED TYPE: Primary | ICD-10-CM

## 2024-05-20 DIAGNOSIS — N40.1 BPH WITH OBSTRUCTION/LOWER URINARY TRACT SYMPTOMS: ICD-10-CM

## 2024-05-20 DIAGNOSIS — R53.83 FATIGUE, UNSPECIFIED TYPE: ICD-10-CM

## 2024-05-20 LAB
ANION GAP SERPL CALCULATED.3IONS-SCNC: 12 MEQ/L (ref 9–15)
BASOPHILS # BLD: 0.1 K/UL (ref 0–0.2)
BASOPHILS NFR BLD: 1.1 %
BUN SERPL-MCNC: 20 MG/DL (ref 8–23)
CALCIUM SERPL-MCNC: 9.5 MG/DL (ref 8.5–9.9)
CHLORIDE SERPL-SCNC: 107 MEQ/L (ref 95–107)
CO2 SERPL-SCNC: 21 MEQ/L (ref 20–31)
CREAT SERPL-MCNC: 1.18 MG/DL (ref 0.7–1.2)
EOSINOPHIL # BLD: 0.1 K/UL (ref 0–0.7)
EOSINOPHIL NFR BLD: 1.8 %
ERYTHROCYTE [DISTWIDTH] IN BLOOD BY AUTOMATED COUNT: 15.5 % (ref 11.5–14.5)
GLUCOSE SERPL-MCNC: 94 MG/DL (ref 70–99)
HCT VFR BLD AUTO: 41 % (ref 42–52)
HGB BLD-MCNC: 13.3 G/DL (ref 14–18)
LYMPHOCYTES # BLD: 1.5 K/UL (ref 1–4.8)
LYMPHOCYTES NFR BLD: 22.8 %
MCH RBC QN AUTO: 27.8 PG (ref 27–31.3)
MCHC RBC AUTO-ENTMCNC: 32.4 % (ref 33–37)
MCV RBC AUTO: 85.6 FL (ref 79–92.2)
MONOCYTES # BLD: 0.6 K/UL (ref 0.2–0.8)
MONOCYTES NFR BLD: 9.1 %
NEUTROPHILS # BLD: 4.2 K/UL (ref 1.4–6.5)
NEUTS SEG NFR BLD: 65 %
PLATELET # BLD AUTO: 290 K/UL (ref 130–400)
POTASSIUM SERPL-SCNC: 4.5 MEQ/L (ref 3.4–4.9)
PSA SERPL-MCNC: 10.97 NG/ML (ref 0–4)
RBC # BLD AUTO: 4.79 M/UL (ref 4.7–6.1)
SODIUM SERPL-SCNC: 140 MEQ/L (ref 135–144)
TSH REFLEX: 2.28 UIU/ML (ref 0.44–3.86)
WBC # BLD AUTO: 6.5 K/UL (ref 4.8–10.8)

## 2024-05-20 PROCEDURE — G8427 DOCREV CUR MEDS BY ELIG CLIN: HCPCS | Performed by: NURSE PRACTITIONER

## 2024-05-20 PROCEDURE — G8417 CALC BMI ABV UP PARAM F/U: HCPCS | Performed by: NURSE PRACTITIONER

## 2024-05-20 PROCEDURE — 3075F SYST BP GE 130 - 139MM HG: CPT | Performed by: NURSE PRACTITIONER

## 2024-05-20 PROCEDURE — 1123F ACP DISCUSS/DSCN MKR DOCD: CPT | Performed by: NURSE PRACTITIONER

## 2024-05-20 PROCEDURE — 4004F PT TOBACCO SCREEN RCVD TLK: CPT | Performed by: NURSE PRACTITIONER

## 2024-05-20 PROCEDURE — 99214 OFFICE O/P EST MOD 30 MIN: CPT | Performed by: NURSE PRACTITIONER

## 2024-05-20 PROCEDURE — 3078F DIAST BP <80 MM HG: CPT | Performed by: NURSE PRACTITIONER

## 2024-05-20 RX ORDER — AMLODIPINE BESYLATE 5 MG/1
5 TABLET ORAL DAILY
Qty: 90 TABLET | Refills: 3 | Status: SHIPPED | OUTPATIENT
Start: 2024-05-20

## 2024-05-20 NOTE — PROGRESS NOTES
[facility] PR[facility]  [address] [address]  Dept: [phone]  Dept Fax: [phone]  Loc: [phone]     FOREST Lindquist (: 1945) is a 79 y.o. male, Established patient, here for evaluation of the following chief complaint(s):  Discuss Medications (Pt states he would like to go back on his old BP medication, 5 mg dose of amlodipine. )      PCP:  Arlin Armas APRN - CNP      Patient is here for follow up on hypertension.  How often are you checking your blood pressure? Couple days a week  What are your average readings?  Usually 120s sbp, lowest 110 and highest 140 like right after coming inside from exerting self.   Are you compliant with your diet? Yes  Do you exercise? No  Are you compliant with your medications? Yes  Are you having difficulty affording your medications? No  Do you have side effects from the medication? Yes- is having fatigue, constipation on higher dose amlodipine and has lost his hair. Feels like his ears are under water intermittently.   Do you have any of the following symptoms?  Chest Pain? No  Palpitations? No  SHEPPARD/SOB? No  Headache? No  Peripheral Edema? No  Light Headedness? No    Last labs:  Lab Results       Component                Value               Date/Time                  NA                       141                 2024 09:39 AM        K                        4.6                 2024 09:39 AM        CL                       106                 2024 09:39 AM        CO2                      22                  2024 09:39 AM        BUN                      20                  2024 09:39 AM        CREATININE               1.19                2024 09:39 AM        GLUCOSE                  80                  02/15/2023 10:53 AM        CALCIUM                  9.8                 2024 09:39 AM     Lab Results

## 2024-05-21 NOTE — TELEPHONE ENCOUNTER
Thyroid and blood counts good/stable. Prostate level just up slightly from last check- will need to keep f/u with uro for this week to go over this.

## 2024-05-23 ENCOUNTER — OFFICE VISIT (OUTPATIENT)
Dept: UROLOGY | Age: 79
End: 2024-05-23
Payer: MEDICARE

## 2024-05-23 VITALS
DIASTOLIC BLOOD PRESSURE: 70 MMHG | HEART RATE: 98 BPM | SYSTOLIC BLOOD PRESSURE: 136 MMHG | BODY MASS INDEX: 27.6 KG/M2 | HEIGHT: 62 IN | WEIGHT: 150 LBS

## 2024-05-23 DIAGNOSIS — R97.20 ELEVATED PSA: Primary | ICD-10-CM

## 2024-05-23 PROCEDURE — G8427 DOCREV CUR MEDS BY ELIG CLIN: HCPCS | Performed by: UROLOGY

## 2024-05-23 PROCEDURE — G8417 CALC BMI ABV UP PARAM F/U: HCPCS | Performed by: UROLOGY

## 2024-05-23 PROCEDURE — 3078F DIAST BP <80 MM HG: CPT | Performed by: UROLOGY

## 2024-05-23 PROCEDURE — 1123F ACP DISCUSS/DSCN MKR DOCD: CPT | Performed by: UROLOGY

## 2024-05-23 PROCEDURE — 99213 OFFICE O/P EST LOW 20 MIN: CPT | Performed by: UROLOGY

## 2024-05-23 PROCEDURE — 4004F PT TOBACCO SCREEN RCVD TLK: CPT | Performed by: UROLOGY

## 2024-05-23 PROCEDURE — 3075F SYST BP GE 130 - 139MM HG: CPT | Performed by: UROLOGY

## 2024-05-23 ASSESSMENT — ENCOUNTER SYMPTOMS
ABDOMINAL DISTENTION: 0
ABDOMINAL PAIN: 0

## 2024-05-23 NOTE — PROGRESS NOTES
Financial Resource Strain (CARDIA)     Difficulty of Paying Living Expenses: Not hard at all   Food Insecurity: No Food Insecurity (2/19/2024)    Hunger Vital Sign     Worried About Running Out of Food in the Last Year: Never true     Ran Out of Food in the Last Year: Never true   Transportation Needs: Unknown (2/19/2024)    PRAPARE - Transportation     Lack of Transportation (Non-Medical): No   Physical Activity: Inactive (2/19/2024)    Exercise Vital Sign     Days of Exercise per Week: 0 days     Minutes of Exercise per Session: 0 min   Housing Stability: Unknown (2/19/2024)    Housing Stability Vital Sign     Unstable Housing in the Last Year: No     Family History   Problem Relation Age of Onset    Coronary Art Dis Paternal Grandfather      Current Outpatient Medications   Medication Sig Dispense Refill    amLODIPine (NORVASC) 5 MG tablet Take 1 tablet by mouth daily 90 tablet 3    atorvastatin (LIPITOR) 40 MG tablet Take 1 tablet by mouth daily 90 tablet 3    tamsulosin (FLOMAX) 0.4 MG capsule Take 1 capsule by mouth daily 90 capsule 3    aspirin 81 MG EC tablet Take 1 tablet by mouth daily 90 tablet 3     No current facility-administered medications for this visit.     Doxycycline  reviewed      Review of Systems   Constitutional:  Negative for unexpected weight change.   Gastrointestinal:  Negative for abdominal distention and abdominal pain.   Genitourinary:  Negative for dysuria, flank pain and hematuria.         Objective:   Physical Exam  Constitutional:       Appearance: Normal appearance.   Abdominal:      General: There is no distension.      Palpations: Abdomen is soft.   Neurological:      Mental Status: He is alert.   Psychiatric:         Mood and Affect: Mood normal.            PSA   Date Value Ref Range Status   05/20/2024 10.97 (H) 0.00 - 4.00 ng/mL Final     Comment:     This Roche electrochemiluminescent immunoassay is performed  on the Tyree e immunoassay analyzer. Results obtained  with

## 2024-08-19 ENCOUNTER — OFFICE VISIT (OUTPATIENT)
Dept: INTERNAL MEDICINE | Age: 79
End: 2024-08-19
Payer: MEDICARE

## 2024-08-19 VITALS
OXYGEN SATURATION: 100 % | SYSTOLIC BLOOD PRESSURE: 142 MMHG | HEART RATE: 81 BPM | HEIGHT: 62 IN | WEIGHT: 146 LBS | BODY MASS INDEX: 26.87 KG/M2 | DIASTOLIC BLOOD PRESSURE: 62 MMHG

## 2024-08-19 DIAGNOSIS — I10 PRIMARY HYPERTENSION: Primary | ICD-10-CM

## 2024-08-19 DIAGNOSIS — J30.2 SEASONAL ALLERGIES: ICD-10-CM

## 2024-08-19 PROCEDURE — 3077F SYST BP >= 140 MM HG: CPT | Performed by: NURSE PRACTITIONER

## 2024-08-19 PROCEDURE — 1123F ACP DISCUSS/DSCN MKR DOCD: CPT | Performed by: NURSE PRACTITIONER

## 2024-08-19 PROCEDURE — 99213 OFFICE O/P EST LOW 20 MIN: CPT | Performed by: NURSE PRACTITIONER

## 2024-08-19 PROCEDURE — 3078F DIAST BP <80 MM HG: CPT | Performed by: NURSE PRACTITIONER

## 2024-08-19 PROCEDURE — 4004F PT TOBACCO SCREEN RCVD TLK: CPT | Performed by: NURSE PRACTITIONER

## 2024-08-19 PROCEDURE — G8417 CALC BMI ABV UP PARAM F/U: HCPCS | Performed by: NURSE PRACTITIONER

## 2024-08-19 PROCEDURE — G8427 DOCREV CUR MEDS BY ELIG CLIN: HCPCS | Performed by: NURSE PRACTITIONER

## 2024-08-19 NOTE — PROGRESS NOTES
Date                       CHOL                     167                 02/06/2023            Lab Results       Component                Value               Date                       TRIG                     56                  02/06/2023            Lab Results       Component                Value               Date                       HDL                      82 (H)              02/19/2024                 HDL                      79 (A)              02/06/2023            No components found for: \"LDLCHOLESTEROL\", \"LDLCALC\"  Lab Results       Component                Value               Date                       VLDL                     11                  02/06/2023            Lab Results       Component                Value               Date                       CHOLHDLRATIO             0.97                02/06/2023               Eyes water and itch. Nose runs constantly. Not taking anything for it. Does have ragweed allergies. Wonders if ok to to take otc medication.        Review of Systems   Constitutional:         See HPI for ROS         Past Medical History:   Diagnosis Date    CKD (chronic kidney disease) stage 3, GFR 30-59 ml/min ([facility]) 5/24/2019    Heart damage     High cholesterol     Hyperopia of both eyes with astigmatism and presbyopia 11/27/2018    Hypertension     Meibomian gland dysfunction (MGD) of upper and lower lids of both eyes 11/27/2018    Punctate keratitis, bilateral 11/27/2018    Sciatica of right side      Past Surgical History:   Procedure Laterality Date    CYST REMOVAL       Social History     Socioeconomic History    Marital status:      Spouse name: Not on file    Number of children: Not on file    Years of education: Not on file    Highest education level: Not on file   Occupational History    Not on file   Tobacco Use    Smoking status: Every Day     Current packs/day: 1.00     Average packs/day: 1 pack/day for 59.6 years (59.6 ttl pk-yrs)     Types: Cigarettes

## 2024-11-25 ENCOUNTER — TELEPHONE (OUTPATIENT)
Dept: UROLOGY | Age: 79
End: 2024-11-25

## 2024-11-25 DIAGNOSIS — I25.10 ATHEROSCLEROSIS OF NATIVE CORONARY ARTERY OF NATIVE HEART WITHOUT ANGINA PECTORIS: ICD-10-CM

## 2024-11-25 DIAGNOSIS — R97.20 ELEVATED PSA: Primary | ICD-10-CM

## 2024-11-25 DIAGNOSIS — R97.20 ELEVATED PSA: ICD-10-CM

## 2024-11-25 LAB — PSA SERPL-MCNC: 11.55 NG/ML (ref 0–4)

## 2024-11-25 RX ORDER — ATORVASTATIN CALCIUM 40 MG/1
40 TABLET, FILM COATED ORAL DAILY
Qty: 90 TABLET | Refills: 3 | Status: SHIPPED | OUTPATIENT
Start: 2024-11-25

## 2024-11-25 NOTE — TELEPHONE ENCOUNTER
Comments:     Last Office Visit (last PCP visit):   8/19/2024    Next Visit Date:  Future Appointments   Date Time Provider Department Center   11/26/2024  9:45 AM Homero Flores MD [place] [facility]   2/24/2025  9:00 AM Arlin Armas APRN - CNP [facility]       **If hasn't been seen in over a year OR hasn't followed up according to last diabetes/ADHD visit, make appointment for patient before sending refill to provider.    Rx requested:  Requested Prescriptions     Pending Prescriptions Disp Refills    atorvastatin (LIPITOR) 40 MG tablet 90 tablet 3     Sig: Take 1 tablet by mouth daily

## 2024-11-26 ENCOUNTER — OFFICE VISIT (OUTPATIENT)
Dept: UROLOGY | Age: 79
End: 2024-11-26
Payer: MEDICARE

## 2024-11-26 VITALS
BODY MASS INDEX: 26.68 KG/M2 | WEIGHT: 145 LBS | OXYGEN SATURATION: 97 % | SYSTOLIC BLOOD PRESSURE: 154 MMHG | DIASTOLIC BLOOD PRESSURE: 74 MMHG | HEART RATE: 115 BPM | HEIGHT: 62 IN

## 2024-11-26 DIAGNOSIS — R97.20 ELEVATED PSA: Primary | ICD-10-CM

## 2024-11-26 PROCEDURE — 1160F RVW MEDS BY RX/DR IN RCRD: CPT | Performed by: UROLOGY

## 2024-11-26 PROCEDURE — 1159F MED LIST DOCD IN RCRD: CPT | Performed by: UROLOGY

## 2024-11-26 PROCEDURE — 3077F SYST BP >= 140 MM HG: CPT | Performed by: UROLOGY

## 2024-11-26 PROCEDURE — G8427 DOCREV CUR MEDS BY ELIG CLIN: HCPCS | Performed by: UROLOGY

## 2024-11-26 PROCEDURE — 99213 OFFICE O/P EST LOW 20 MIN: CPT | Performed by: UROLOGY

## 2024-11-26 PROCEDURE — G8484 FLU IMMUNIZE NO ADMIN: HCPCS | Performed by: UROLOGY

## 2024-11-26 PROCEDURE — G8417 CALC BMI ABV UP PARAM F/U: HCPCS | Performed by: UROLOGY

## 2024-11-26 PROCEDURE — 3078F DIAST BP <80 MM HG: CPT | Performed by: UROLOGY

## 2024-11-26 PROCEDURE — 4004F PT TOBACCO SCREEN RCVD TLK: CPT | Performed by: UROLOGY

## 2024-11-26 PROCEDURE — 1123F ACP DISCUSS/DSCN MKR DOCD: CPT | Performed by: UROLOGY

## 2024-11-26 ASSESSMENT — ENCOUNTER SYMPTOMS
ABDOMINAL DISTENTION: 0
ABDOMINAL PAIN: 0

## 2024-11-26 NOTE — PROGRESS NOTES
Subjective:      Patient ID: Thierry Lindquist is a 79 y.o. male    HPI  This is a 78 yo male with CKD (Creat 1.29), HTN, BPH and LUTs on Flomax and long h/o elevated PSA's. Since last seen on 5/23/24, he still feels the Flomax is helping his LUTs. He has no SE and denies hematuria or dysuria or pain. I reviewed the interval PSA. He has a long h/o elevated PSA's being managed at [facility] by Dr Kimble in 2019. He had PSA in the 9-11 range and had prior biopsy showing ASAP followed by a prostate MRI that showed a susp lesion in the TZ and MRI fusion biopsy was recommended but never completed for unclear reasons per my prior review of the records . He has no other complaints.  He has no new medical or surgical problems.         PV 68 cc by US and biopsy was neg for cancer 5/19 but ASAP x 2  MRI prostate 5/19: susp TZ lesion 1.7 cm, no LN or osseous lesion    Past Medical History:   Diagnosis Date    CKD (chronic kidney disease) stage 3, GFR 30-59 ml/min ([facility]) 5/24/2019    Heart damage     High cholesterol     Hyperopia of both eyes with astigmatism and presbyopia 11/27/2018    Hypertension     Meibomian gland dysfunction (MGD) of upper and lower lids of both eyes 11/27/2018    Punctate keratitis, bilateral 11/27/2018    Sciatica of right side      Past Surgical History:   Procedure Laterality Date    CYST REMOVAL       Social History     Socioeconomic History    Marital status:      Spouse name: None    Number of children: None    Years of education: None    Highest education level: None   Tobacco Use    Smoking status: Every Day     Current packs/day: 1.00     Average packs/day: 1 pack/day for 59.9 years (59.9 ttl pk-yrs)     Types: Cigarettes     Start date: 1965    Smokeless tobacco: Never    Tobacco comments:     Says he doesn't really inhale   Substance and Sexual Activity    Alcohol use: Yes     Alcohol/week: 1.0 standard drink of alcohol     Types: 1 Cans of beer per week     Comment: occassional    Drug

## 2025-02-24 ENCOUNTER — OFFICE VISIT (OUTPATIENT)
Dept: INTERNAL MEDICINE | Age: 80
End: 2025-02-24
Payer: MEDICARE

## 2025-02-24 VITALS
HEART RATE: 97 BPM | BODY MASS INDEX: 27.36 KG/M2 | HEIGHT: 63 IN | RESPIRATION RATE: 16 BRPM | SYSTOLIC BLOOD PRESSURE: 166 MMHG | DIASTOLIC BLOOD PRESSURE: 88 MMHG | OXYGEN SATURATION: 96 % | WEIGHT: 154.4 LBS

## 2025-02-24 DIAGNOSIS — N40.1 BPH WITH OBSTRUCTION/LOWER URINARY TRACT SYMPTOMS: ICD-10-CM

## 2025-02-24 DIAGNOSIS — R73.01 IMPAIRED FASTING GLUCOSE: ICD-10-CM

## 2025-02-24 DIAGNOSIS — D64.9 ANEMIA, UNSPECIFIED TYPE: ICD-10-CM

## 2025-02-24 DIAGNOSIS — Z00.00 MEDICARE ANNUAL WELLNESS VISIT, SUBSEQUENT: Primary | ICD-10-CM

## 2025-02-24 DIAGNOSIS — N13.8 BPH WITH OBSTRUCTION/LOWER URINARY TRACT SYMPTOMS: ICD-10-CM

## 2025-02-24 DIAGNOSIS — I10 PRIMARY HYPERTENSION: ICD-10-CM

## 2025-02-24 DIAGNOSIS — E78.49 OTHER HYPERLIPIDEMIA: ICD-10-CM

## 2025-02-24 DIAGNOSIS — K59.01 SLOW TRANSIT CONSTIPATION: ICD-10-CM

## 2025-02-24 DIAGNOSIS — I25.10 ATHEROSCLEROSIS OF NATIVE CORONARY ARTERY OF NATIVE HEART WITHOUT ANGINA PECTORIS: ICD-10-CM

## 2025-02-24 LAB
ALBUMIN SERPL-MCNC: 4.4 G/DL (ref 3.5–4.6)
ALP SERPL-CCNC: 96 U/L (ref 35–104)
ALT SERPL-CCNC: 10 U/L (ref 0–41)
ANION GAP SERPL CALCULATED.3IONS-SCNC: 15 MEQ/L (ref 9–15)
AST SERPL-CCNC: 20 U/L (ref 0–40)
BASOPHILS # BLD: 0.1 K/UL (ref 0–0.2)
BASOPHILS NFR BLD: 1.3 %
BILIRUB SERPL-MCNC: 0.4 MG/DL (ref 0.2–0.7)
BUN SERPL-MCNC: 22 MG/DL (ref 8–23)
CALCIUM SERPL-MCNC: 9.6 MG/DL (ref 8.5–9.9)
CHLORIDE SERPL-SCNC: 105 MEQ/L (ref 95–107)
CHOLEST SERPL-MCNC: 170 MG/DL (ref 0–199)
CO2 SERPL-SCNC: 23 MEQ/L (ref 20–31)
CREAT SERPL-MCNC: 1.13 MG/DL (ref 0.7–1.2)
EOSINOPHIL # BLD: 0.2 K/UL (ref 0–0.7)
EOSINOPHIL NFR BLD: 2.6 %
ERYTHROCYTE [DISTWIDTH] IN BLOOD BY AUTOMATED COUNT: 14.8 % (ref 11.5–14.5)
GLOBULIN SER CALC-MCNC: 2.8 G/DL (ref 2.3–3.5)
GLUCOSE FASTING: 103 MG/DL (ref 70–99)
HCT VFR BLD AUTO: 43.5 % (ref 42–52)
HDLC SERPL-MCNC: 82 MG/DL (ref 40–59)
HGB BLD-MCNC: 14.2 G/DL (ref 14–18)
LDL CHOLESTEROL: 80 MG/DL (ref 0–129)
LYMPHOCYTES # BLD: 1.2 K/UL (ref 1–4.8)
LYMPHOCYTES NFR BLD: 19.3 %
MCH RBC QN AUTO: 28.7 PG (ref 27–31.3)
MCHC RBC AUTO-ENTMCNC: 32.6 % (ref 33–37)
MCV RBC AUTO: 87.9 FL (ref 79–92.2)
MONOCYTES # BLD: 0.6 K/UL (ref 0.2–0.8)
MONOCYTES NFR BLD: 8.9 %
NEUTROPHILS # BLD: 4.2 K/UL (ref 1.4–6.5)
NEUTS SEG NFR BLD: 67.6 %
PLATELET # BLD AUTO: 302 K/UL (ref 130–400)
POTASSIUM SERPL-SCNC: 4.8 MEQ/L (ref 3.4–4.9)
PROT SERPL-MCNC: 7.2 G/DL (ref 6.3–8)
RBC # BLD AUTO: 4.95 M/UL (ref 4.7–6.1)
SODIUM SERPL-SCNC: 143 MEQ/L (ref 135–144)
TRIGLYCERIDE, FASTING: 39 MG/DL (ref 0–150)
WBC # BLD AUTO: 6.2 K/UL (ref 4.8–10.8)

## 2025-02-24 PROCEDURE — 1160F RVW MEDS BY RX/DR IN RCRD: CPT | Performed by: NURSE PRACTITIONER

## 2025-02-24 PROCEDURE — 3079F DIAST BP 80-89 MM HG: CPT | Performed by: NURSE PRACTITIONER

## 2025-02-24 PROCEDURE — G0439 PPPS, SUBSEQ VISIT: HCPCS | Performed by: NURSE PRACTITIONER

## 2025-02-24 PROCEDURE — 3077F SYST BP >= 140 MM HG: CPT | Performed by: NURSE PRACTITIONER

## 2025-02-24 PROCEDURE — 1123F ACP DISCUSS/DSCN MKR DOCD: CPT | Performed by: NURSE PRACTITIONER

## 2025-02-24 PROCEDURE — 1159F MED LIST DOCD IN RCRD: CPT | Performed by: NURSE PRACTITIONER

## 2025-02-24 RX ORDER — AMLODIPINE BESYLATE 5 MG/1
5 TABLET ORAL DAILY
Qty: 90 TABLET | Refills: 3 | Status: SHIPPED | OUTPATIENT
Start: 2025-02-24

## 2025-02-24 RX ORDER — TAMSULOSIN HYDROCHLORIDE 0.4 MG/1
0.4 CAPSULE ORAL DAILY
Qty: 90 CAPSULE | Refills: 3 | Status: SHIPPED | OUTPATIENT
Start: 2025-02-24

## 2025-02-24 RX ORDER — ASPIRIN 81 MG/1
81 TABLET ORAL DAILY
Qty: 90 TABLET | Refills: 3 | Status: SHIPPED | OUTPATIENT
Start: 2025-02-24

## 2025-02-24 RX ORDER — POLYETHYLENE GLYCOL 3350 17 G/17G
17 POWDER, FOR SOLUTION ORAL DAILY PRN
COMMUNITY

## 2025-02-24 SDOH — ECONOMIC STABILITY: FOOD INSECURITY: WITHIN THE PAST 12 MONTHS, YOU WORRIED THAT YOUR FOOD WOULD RUN OUT BEFORE YOU GOT MONEY TO BUY MORE.: NEVER TRUE

## 2025-02-24 SDOH — ECONOMIC STABILITY: FOOD INSECURITY: WITHIN THE PAST 12 MONTHS, THE FOOD YOU BOUGHT JUST DIDN'T LAST AND YOU DIDN'T HAVE MONEY TO GET MORE.: NEVER TRUE

## 2025-02-24 ASSESSMENT — LIFESTYLE VARIABLES
HOW MANY STANDARD DRINKS CONTAINING ALCOHOL DO YOU HAVE ON A TYPICAL DAY: 1 OR 2
HOW OFTEN DO YOU HAVE A DRINK CONTAINING ALCOHOL: 2-4 TIMES A MONTH

## 2025-02-24 ASSESSMENT — PATIENT HEALTH QUESTIONNAIRE - PHQ9
1. LITTLE INTEREST OR PLEASURE IN DOING THINGS: NOT AT ALL
SUM OF ALL RESPONSES TO PHQ QUESTIONS 1-9: 0
SUM OF ALL RESPONSES TO PHQ QUESTIONS 1-9: 0
SUM OF ALL RESPONSES TO PHQ9 QUESTIONS 1 & 2: 0
2. FEELING DOWN, DEPRESSED OR HOPELESS: NOT AT ALL
SUM OF ALL RESPONSES TO PHQ QUESTIONS 1-9: 0
SUM OF ALL RESPONSES TO PHQ QUESTIONS 1-9: 0

## 2025-02-24 NOTE — PATIENT INSTRUCTIONS
device in the bathroom with you.   Where can you learn more?  Go to https://www.Goodreads.net/patientEd and enter G117 to learn more about \"Preventing Falls: Care Instructions.\"  Current as of: July 31, 2024  Content Version: 14.3  © 2024 [employer].   Care instructions adapted under license by [employer]. If you have questions about a medical condition or this instruction, always ask your healthcare professional. [employer] disclaims any warranty or liability for your use of this information.         Learning About Mild Cognitive Impairment (MCI)  What is mild cognitive impairment (MCI)?     It's common to forget things sometimes as we get older. But some older people have memory loss that's more than normal aging. It's called mild cognitive impairment, or MCI. It is not the same as dementia.  People with the condition often know that their memory or mental function has changed. Tests may show some loss. But their minds work well overall. They can carry out daily tasks that are normal for them.  People with MCI have a higher chance of one day getting dementia. But not all people who have it will get dementia. Some people may stay the same over time.  What are the symptoms?  People with MCI have more memory loss than what occurs with normal aging. They may have increasing trouble with recalling words and keeping up with conversations. They may also have trouble remembering important events and making decisions.  What puts you at risk?  The risk of getting MCI increases with age. Having high blood pressure or having a family history of MCI may also increase your risk.  How is it diagnosed?  Your doctor will do a physical exam.  You may be asked questions to check your memory and other mental skills. Your doctor may also talk to close friends and family members. This can help the doctor figure out how your memory and other mental skills have changed.  You may get blood tests and tests that

## 2025-02-24 NOTE — PROGRESS NOTES
Medicare Annual Wellness Visit    Thierry Lindquist is here for Medicare AWV (No concerns with health at this time. Patient is fasting. )    Assessment & Plan  1. Medicare annual wellness visit.  AWV complete.   -He has been experiencing constipation, necessitating the intermittent use of polyethylene glycol as a laxative. He reports no recent falls or balance issues but acknowledges a decline in his physical capabilities compared to his younger years. He does not have any loose rugs at home. He reports no chest pain and maintains his usual level of activity. He was advised to continue using polyethylene glycol as needed for constipation relief. He was also counseled on the importance of installing smoke detectors in his home, enc to reach out to local fire dept and wearing his seatbelt while driving.    2. Primary hypertension.  Chronic. Unchanged. His blood pressure readings have been consistently elevated during office visits, with a current reading of 166/88. He reported a home reading of 144 systolic this morning which was high for him. Hasn't checked in months. When checked prior to last visit, was 110-130 sbp on avg (10 readings). He was advised to monitor his blood pressure more frequently at home. If home readings consistently reach 140 systolic, a discussion about potential medication adjustments will be necessary.    3. Hyperlipidemia.  Chronic. On Atorvastatin. Check lipids.    4. Impaired fasting glucose.  -chronic, check A1c. Watch sugar in diet.    5. Atherosclerosis of native coronary artery of native heart without angina pectoris.  -chronic, stable. Continue aspirin and statin. Not on beta blocker, unsure why.    6. Anemia, unspecified type.  -chronic, check cbc. Mild possibly iron def noted on last labs.    7. Bph with obstruction/lower uts  Chronic, continue flomax. He reported frequent urination and occasional normal patterns. He follows up with Dr. Flores for this issue and elev PSA and last saw him

## 2025-02-25 LAB
ESTIMATED AVERAGE GLUCOSE: 114 MG/DL
HBA1C MFR BLD: 5.6 % (ref 4–6)

## 2025-03-05 ENCOUNTER — TELEPHONE (OUTPATIENT)
Dept: INTERNAL MEDICINE | Age: 80
End: 2025-03-05

## 2025-03-05 NOTE — TELEPHONE ENCOUNTER
Pt did not review my chart message. Please inform:     Nilay Guadarrama,  Your labs look good- very stable when compared to last year which is great. Take care, Arlin

## 2025-03-12 ENCOUNTER — OFFICE VISIT (OUTPATIENT)
Dept: INTERNAL MEDICINE | Age: 80
End: 2025-03-12
Payer: MEDICARE

## 2025-03-12 VITALS
OXYGEN SATURATION: 98 % | SYSTOLIC BLOOD PRESSURE: 148 MMHG | WEIGHT: 146 LBS | RESPIRATION RATE: 18 BRPM | BODY MASS INDEX: 26.28 KG/M2 | DIASTOLIC BLOOD PRESSURE: 84 MMHG | HEART RATE: 107 BPM

## 2025-03-12 DIAGNOSIS — R73.01 IMPAIRED FASTING GLUCOSE: ICD-10-CM

## 2025-03-12 DIAGNOSIS — I10 PRIMARY HYPERTENSION: ICD-10-CM

## 2025-03-12 DIAGNOSIS — I25.10 ATHEROSCLEROSIS OF NATIVE CORONARY ARTERY OF NATIVE HEART WITHOUT ANGINA PECTORIS: ICD-10-CM

## 2025-03-12 DIAGNOSIS — K59.04 CHRONIC IDIOPATHIC CONSTIPATION: ICD-10-CM

## 2025-03-12 DIAGNOSIS — N40.1 BPH WITH OBSTRUCTION/LOWER URINARY TRACT SYMPTOMS: ICD-10-CM

## 2025-03-12 DIAGNOSIS — N13.8 BPH WITH OBSTRUCTION/LOWER URINARY TRACT SYMPTOMS: ICD-10-CM

## 2025-03-12 DIAGNOSIS — R00.0 TACHYCARDIA: ICD-10-CM

## 2025-03-12 DIAGNOSIS — E78.49 OTHER HYPERLIPIDEMIA: ICD-10-CM

## 2025-03-12 DIAGNOSIS — H25.813 COMBINED FORMS OF AGE-RELATED CATARACT OF BOTH EYES: ICD-10-CM

## 2025-03-12 DIAGNOSIS — R94.31 ABNORMAL ECG: ICD-10-CM

## 2025-03-12 DIAGNOSIS — Z01.818 PREOPERATIVE CLEARANCE: Primary | ICD-10-CM

## 2025-03-12 DIAGNOSIS — Z01.818 PREOPERATIVE CLEARANCE: ICD-10-CM

## 2025-03-12 LAB
ANION GAP SERPL CALCULATED.3IONS-SCNC: 18 MEQ/L (ref 9–15)
BUN SERPL-MCNC: 12 MG/DL (ref 8–23)
CALCIUM SERPL-MCNC: 9.4 MG/DL (ref 8.5–9.9)
CHLORIDE SERPL-SCNC: 102 MEQ/L (ref 95–107)
CO2 SERPL-SCNC: 20 MEQ/L (ref 20–31)
CREAT SERPL-MCNC: 0.94 MG/DL (ref 0.7–1.2)
GLUCOSE SERPL-MCNC: 99 MG/DL (ref 70–99)
POTASSIUM SERPL-SCNC: 4.4 MEQ/L (ref 3.4–4.9)
SODIUM SERPL-SCNC: 140 MEQ/L (ref 135–144)

## 2025-03-12 PROCEDURE — 93005 ELECTROCARDIOGRAM TRACING: CPT | Performed by: NURSE PRACTITIONER

## 2025-03-12 RX ORDER — METOPROLOL SUCCINATE 25 MG/1
25 TABLET, EXTENDED RELEASE ORAL DAILY
Qty: 30 TABLET | Refills: 2 | Status: SHIPPED | OUTPATIENT
Start: 2025-03-12

## 2025-03-12 RX ORDER — FLUOROMETHOLONE 1 MG/ML
1 SUSPENSION/ DROPS OPHTHALMIC 3 TIMES DAILY
COMMUNITY
Start: 2025-02-28

## 2025-03-12 RX ORDER — DOCUSATE SODIUM 100 MG/1
100 CAPSULE, LIQUID FILLED ORAL 2 TIMES DAILY
Qty: 60 CAPSULE | Refills: 2 | Status: SHIPPED | OUTPATIENT
Start: 2025-03-12 | End: 2025-06-10

## 2025-03-12 NOTE — PROGRESS NOTES
right side                                                   Past Surgical History:   Procedure Laterality Date    CYST REMOVAL      MULTIPLE TOOTH EXTRACTIONS      all teeth removed        Social History     Socioeconomic History    Marital status:      Spouse name: Not on file    Number of children: Not on file    Years of education: Not on file    Highest education level: Not on file   Occupational History    Not on file   Tobacco Use    Smoking status: Every Day     Current packs/day: 1.00     Average packs/day: 1 pack/day for 60.2 years (60.2 ttl pk-yrs)     Types: Cigarettes     Start date: 1965    Smokeless tobacco: Never    Tobacco comments:     Says he doesn't really inhale   Substance and Sexual Activity    Alcohol use: Yes     Alcohol/week: 1.0 standard drink of alcohol     Types: 1 Cans of beer per week     Comment: occassional    Drug use: Never    Sexual activity: Not on file   Other Topics Concern    Not on file   Social History Narrative    Not on file     Social Drivers of Health     Financial Resource Strain: Low Risk  (2/19/2024)    Overall Financial Resource Strain (CARDIA)     Difficulty of Paying Living Expenses: Not hard at all   Food Insecurity: No Food Insecurity (2/24/2025)    Hunger Vital Sign     Worried About Running Out of Food in the Last Year: Never true     Ran Out of Food in the Last Year: Never true   Transportation Needs: No Transportation Needs (2/24/2025)    PRAPARE - Transportation     Lack of Transportation (Medical): No     Lack of Transportation (Non-Medical): No   Physical Activity: Inactive (2/24/2025)    Exercise Vital Sign     Days of Exercise per Week: 0 days     Minutes of Exercise per Session: 0 min   Stress: Not on file   Social Connections: Not on file   Intimate Partner Violence: Not on file   Housing Stability: Low Risk  (2/24/2025)    Housing Stability Vital Sign     Unable to Pay for Housing in the Last Year: No     Number of Times Moved in the Last Year:

## 2025-03-13 ENCOUNTER — RESULTS FOLLOW-UP (OUTPATIENT)
Dept: INTERNAL MEDICINE | Age: 80
End: 2025-03-13

## 2025-03-28 ENCOUNTER — PREP FOR PROCEDURE (OUTPATIENT)
Dept: OPERATING ROOM | Facility: HOSPITAL | Age: 80
End: 2025-03-28
Payer: MEDICARE

## 2025-03-28 ENCOUNTER — PREP FOR PROCEDURE (OUTPATIENT)
Dept: OPERATING ROOM | Facility: HOSPITAL | Age: 80
End: 2025-03-28

## 2025-03-28 DIAGNOSIS — H25.812 COMBINED FORMS OF AGE-RELATED CATARACT OF LEFT EYE: Primary | ICD-10-CM

## 2025-03-28 RX ORDER — KETOROLAC TROMETHAMINE 5 MG/ML
1 SOLUTION OPHTHALMIC
OUTPATIENT
Start: 2025-03-28 | End: 2025-03-28

## 2025-03-28 RX ORDER — POVIDONE-IODINE 5 %
SOLUTION, NON-ORAL OPHTHALMIC (EYE) ONCE
OUTPATIENT
Start: 2025-03-28 | End: 2025-03-28

## 2025-03-28 RX ORDER — PREDNISOLONE ACETATE 10 MG/ML
1 SUSPENSION/ DROPS OPHTHALMIC ONCE
OUTPATIENT
Start: 2025-03-28 | End: 2025-03-28

## 2025-03-28 RX ORDER — TETRACAINE HYDROCHLORIDE 5 MG/ML
1 SOLUTION OPHTHALMIC ONCE
OUTPATIENT
Start: 2025-03-28 | End: 2025-03-28

## 2025-04-01 RX ORDER — TAMSULOSIN HYDROCHLORIDE 0.4 MG/1
1 CAPSULE ORAL
COMMUNITY
Start: 2025-02-24

## 2025-04-01 RX ORDER — METOPROLOL SUCCINATE 25 MG/1
1 TABLET, EXTENDED RELEASE ORAL DAILY
COMMUNITY
Start: 2025-03-12

## 2025-04-01 RX ORDER — AMLODIPINE BESYLATE 5 MG/1
1 TABLET ORAL DAILY
COMMUNITY
Start: 2024-02-08

## 2025-04-01 RX ORDER — ASPIRIN 81 MG/1
1 TABLET ORAL DAILY
COMMUNITY
Start: 2025-02-24

## 2025-04-01 RX ORDER — ATORVASTATIN CALCIUM 40 MG/1
40 TABLET, FILM COATED ORAL DAILY
COMMUNITY

## 2025-04-01 NOTE — PREPROCEDURE INSTRUCTIONS
NPO Instructions:    Do not eat any food after midnight the night before your surgery/procedure.  You may have clear liquids until TWO hours before surgery/procedure. This includes water, black tea/coffee, (no milk or cream) apple juice and electrolyte drinks (Gatorade).    Additional Instructions:     Will need  home, will receive call day before surgery with arrival time

## 2025-04-02 ENCOUNTER — ANESTHESIA EVENT (OUTPATIENT)
Dept: OPERATING ROOM | Facility: HOSPITAL | Age: 80
End: 2025-04-02
Payer: MEDICARE

## 2025-04-02 RX ORDER — ONDANSETRON HYDROCHLORIDE 2 MG/ML
4 INJECTION, SOLUTION INTRAVENOUS ONCE AS NEEDED
Status: CANCELLED | OUTPATIENT
Start: 2025-04-02

## 2025-04-03 ENCOUNTER — HOSPITAL ENCOUNTER (OUTPATIENT)
Facility: HOSPITAL | Age: 80
Setting detail: OUTPATIENT SURGERY
Discharge: HOME | End: 2025-04-03
Attending: OPHTHALMOLOGY | Admitting: OPHTHALMOLOGY
Payer: MEDICARE

## 2025-04-03 ENCOUNTER — ANESTHESIA (OUTPATIENT)
Dept: OPERATING ROOM | Facility: HOSPITAL | Age: 80
End: 2025-04-03
Payer: MEDICARE

## 2025-04-03 VITALS
HEIGHT: 63 IN | DIASTOLIC BLOOD PRESSURE: 65 MMHG | OXYGEN SATURATION: 96 % | HEART RATE: 75 BPM | TEMPERATURE: 97.5 F | WEIGHT: 147.05 LBS | RESPIRATION RATE: 20 BRPM | BODY MASS INDEX: 26.05 KG/M2 | SYSTOLIC BLOOD PRESSURE: 128 MMHG

## 2025-04-03 PROBLEM — H26.9 CATARACT PRESENT: Status: ACTIVE | Noted: 2025-04-03

## 2025-04-03 PROBLEM — R11.2 PONV (POSTOPERATIVE NAUSEA AND VOMITING): Status: ACTIVE | Noted: 2025-04-03

## 2025-04-03 PROBLEM — I21.9 MI (MYOCARDIAL INFARCTION) (MULTI): Status: ACTIVE | Noted: 2025-04-03

## 2025-04-03 PROBLEM — Z98.890 PONV (POSTOPERATIVE NAUSEA AND VOMITING): Status: ACTIVE | Noted: 2025-04-03

## 2025-04-03 PROBLEM — I25.10 CAD (CORONARY ARTERY DISEASE): Status: ACTIVE | Noted: 2025-04-03

## 2025-04-03 PROBLEM — I10 HTN (HYPERTENSION): Status: ACTIVE | Noted: 2025-04-03

## 2025-04-03 PROCEDURE — 2500000004 HC RX 250 GENERAL PHARMACY W/ HCPCS (ALT 636 FOR OP/ED): Performed by: ANESTHESIOLOGY

## 2025-04-03 PROCEDURE — 2760000001 HC OR 276 NO HCPCS: Performed by: OPHTHALMOLOGY

## 2025-04-03 PROCEDURE — V2632 POST CHMBR INTRAOCULAR LENS: HCPCS | Performed by: OPHTHALMOLOGY

## 2025-04-03 PROCEDURE — 3600000008 HC OR TIME - EACH INCREMENTAL 1 MINUTE - PROCEDURE LEVEL THREE: Performed by: OPHTHALMOLOGY

## 2025-04-03 PROCEDURE — 3700000001 HC GENERAL ANESTHESIA TIME - INITIAL BASE CHARGE: Performed by: OPHTHALMOLOGY

## 2025-04-03 PROCEDURE — 2500000001 HC RX 250 WO HCPCS SELF ADMINISTERED DRUGS (ALT 637 FOR MEDICARE OP): Performed by: OPHTHALMOLOGY

## 2025-04-03 PROCEDURE — 7100000010 HC PHASE TWO TIME - EACH INCREMENTAL 1 MINUTE: Performed by: OPHTHALMOLOGY

## 2025-04-03 PROCEDURE — 2500000004 HC RX 250 GENERAL PHARMACY W/ HCPCS (ALT 636 FOR OP/ED): Performed by: OPHTHALMOLOGY

## 2025-04-03 PROCEDURE — 3600000003 HC OR TIME - INITIAL BASE CHARGE - PROCEDURE LEVEL THREE: Performed by: OPHTHALMOLOGY

## 2025-04-03 PROCEDURE — 2500000005 HC RX 250 GENERAL PHARMACY W/O HCPCS: Performed by: OPHTHALMOLOGY

## 2025-04-03 PROCEDURE — 2720000007 HC OR 272 NO HCPCS: Performed by: OPHTHALMOLOGY

## 2025-04-03 PROCEDURE — 3700000002 HC GENERAL ANESTHESIA TIME - EACH INCREMENTAL 1 MINUTE: Performed by: OPHTHALMOLOGY

## 2025-04-03 PROCEDURE — 7100000009 HC PHASE TWO TIME - INITIAL BASE CHARGE: Performed by: OPHTHALMOLOGY

## 2025-04-03 DEVICE — IMPLANTABLE DEVICE: Type: IMPLANTABLE DEVICE | Site: EYE | Status: FUNCTIONAL

## 2025-04-03 RX ORDER — KETOROLAC TROMETHAMINE 5 MG/ML
1 SOLUTION OPHTHALMIC
Status: COMPLETED | OUTPATIENT
Start: 2025-04-03 | End: 2025-04-03

## 2025-04-03 RX ORDER — SODIUM CHLORIDE, SODIUM LACTATE, POTASSIUM CHLORIDE, CALCIUM CHLORIDE 600; 310; 30; 20 MG/100ML; MG/100ML; MG/100ML; MG/100ML
INJECTION, SOLUTION INTRAVENOUS CONTINUOUS PRN
Status: DISCONTINUED | OUTPATIENT
Start: 2025-04-03 | End: 2025-04-03

## 2025-04-03 RX ORDER — SODIUM CHLORIDE, SODIUM LACTATE, POTASSIUM CHLORIDE, CALCIUM CHLORIDE 600; 310; 30; 20 MG/100ML; MG/100ML; MG/100ML; MG/100ML
50 INJECTION, SOLUTION INTRAVENOUS CONTINUOUS
Status: CANCELLED | OUTPATIENT
Start: 2025-04-03 | End: 2025-04-04

## 2025-04-03 RX ORDER — FENTANYL CITRATE 50 UG/ML
INJECTION, SOLUTION INTRAMUSCULAR; INTRAVENOUS AS NEEDED
Status: DISCONTINUED | OUTPATIENT
Start: 2025-04-03 | End: 2025-04-03

## 2025-04-03 RX ORDER — ONDANSETRON HYDROCHLORIDE 2 MG/ML
4 INJECTION, SOLUTION INTRAVENOUS ONCE
Status: COMPLETED | OUTPATIENT
Start: 2025-04-03 | End: 2025-04-03

## 2025-04-03 RX ORDER — MIDAZOLAM HYDROCHLORIDE 1 MG/ML
1 INJECTION INTRAMUSCULAR; INTRAVENOUS ONCE
Status: COMPLETED | OUTPATIENT
Start: 2025-04-03 | End: 2025-04-03

## 2025-04-03 RX ORDER — PREDNISOLONE ACETATE 10 MG/ML
1 SUSPENSION/ DROPS OPHTHALMIC ONCE
Status: COMPLETED | OUTPATIENT
Start: 2025-04-03 | End: 2025-04-03

## 2025-04-03 RX ORDER — MIDAZOLAM HYDROCHLORIDE 1 MG/ML
INJECTION INTRAMUSCULAR; INTRAVENOUS AS NEEDED
Status: DISCONTINUED | OUTPATIENT
Start: 2025-04-03 | End: 2025-04-03

## 2025-04-03 RX ORDER — LIDOCAINE HYDROCHLORIDE AND EPINEPHRINE 10; 10 UG/ML; MG/ML
INJECTION, SOLUTION INFILTRATION; PERINEURAL AS NEEDED
Status: DISCONTINUED | OUTPATIENT
Start: 2025-04-03 | End: 2025-04-03 | Stop reason: HOSPADM

## 2025-04-03 RX ORDER — POVIDONE-IODINE 5 %
SOLUTION, NON-ORAL OPHTHALMIC (EYE) ONCE
Status: COMPLETED | OUTPATIENT
Start: 2025-04-03 | End: 2025-04-03

## 2025-04-03 RX ORDER — LIDOCAINE HYDROCHLORIDE 10 MG/ML
INJECTION, SOLUTION EPIDURAL; INFILTRATION; INTRACAUDAL; PERINEURAL AS NEEDED
Status: DISCONTINUED | OUTPATIENT
Start: 2025-04-03 | End: 2025-04-03

## 2025-04-03 RX ORDER — TETRACAINE HYDROCHLORIDE 5 MG/ML
1 SOLUTION OPHTHALMIC ONCE
Status: COMPLETED | OUTPATIENT
Start: 2025-04-03 | End: 2025-04-03

## 2025-04-03 RX ORDER — PROPOFOL 10 MG/ML
INJECTION, EMULSION INTRAVENOUS AS NEEDED
Status: DISCONTINUED | OUTPATIENT
Start: 2025-04-03 | End: 2025-04-03

## 2025-04-03 RX ADMIN — POVIDONE-IODINE: 5 SOLUTION OPHTHALMIC at 11:29

## 2025-04-03 RX ADMIN — PHENYLEPHRINE HYDROCHLORIDE 1 DROP: 100 SOLUTION/ DROPS OPHTHALMIC at 11:47

## 2025-04-03 RX ADMIN — ONDANSETRON 4 MG: 2 INJECTION INTRAMUSCULAR; INTRAVENOUS at 11:54

## 2025-04-03 RX ADMIN — TETRACAINE HYDROCHLORIDE 1 DROP: 5 SOLUTION OPHTHALMIC at 11:29

## 2025-04-03 RX ADMIN — MIDAZOLAM HYDROCHLORIDE 1 MG: 1 INJECTION, SOLUTION INTRAMUSCULAR; INTRAVENOUS at 11:55

## 2025-04-03 RX ADMIN — POLYVINYL ALCOHOL, POVIDONE 1 DROP: 14; 6 SOLUTION/ DROPS OPHTHALMIC at 11:42

## 2025-04-03 RX ADMIN — PHENYLEPHRINE HYDROCHLORIDE 1 DROP: 100 SOLUTION/ DROPS OPHTHALMIC at 11:42

## 2025-04-03 RX ADMIN — POLYVINYL ALCOHOL, POVIDONE 1 DROP: 14; 6 SOLUTION/ DROPS OPHTHALMIC at 11:32

## 2025-04-03 RX ADMIN — PROPOFOL 5 MG: 10 INJECTION, EMULSION INTRAVENOUS at 12:33

## 2025-04-03 RX ADMIN — KETOROLAC TROMETHAMINE 1 DROP: 5 SOLUTION OPHTHALMIC at 11:42

## 2025-04-03 RX ADMIN — MIDAZOLAM HYDROCHLORIDE 0.5 MG: 1 INJECTION, SOLUTION INTRAMUSCULAR; INTRAVENOUS at 12:29

## 2025-04-03 RX ADMIN — POLYVINYL ALCOHOL, POVIDONE 1 DROP: 14; 6 SOLUTION/ DROPS OPHTHALMIC at 11:47

## 2025-04-03 RX ADMIN — FENTANYL CITRATE 25 MCG: 50 INJECTION, SOLUTION INTRAMUSCULAR; INTRAVENOUS at 12:29

## 2025-04-03 RX ADMIN — KETOROLAC TROMETHAMINE 1 DROP: 5 SOLUTION OPHTHALMIC at 11:32

## 2025-04-03 RX ADMIN — SODIUM CHLORIDE, POTASSIUM CHLORIDE, SODIUM LACTATE AND CALCIUM CHLORIDE: 600; 310; 30; 20 INJECTION, SOLUTION INTRAVENOUS at 12:00

## 2025-04-03 RX ADMIN — PROPOFOL 5 MG: 10 INJECTION, EMULSION INTRAVENOUS at 12:40

## 2025-04-03 RX ADMIN — KETOROLAC TROMETHAMINE 1 DROP: 5 SOLUTION OPHTHALMIC at 11:37

## 2025-04-03 RX ADMIN — LIDOCAINE HYDROCHLORIDE 30 MG: 10 INJECTION, SOLUTION EPIDURAL; INFILTRATION; INTRACAUDAL; PERINEURAL at 12:32

## 2025-04-03 RX ADMIN — PHENYLEPHRINE HYDROCHLORIDE 1 DROP: 100 SOLUTION/ DROPS OPHTHALMIC at 11:32

## 2025-04-03 RX ADMIN — PHENYLEPHRINE HYDROCHLORIDE 1 DROP: 100 SOLUTION/ DROPS OPHTHALMIC at 11:37

## 2025-04-03 RX ADMIN — POLYVINYL ALCOHOL, POVIDONE 1 DROP: 14; 6 SOLUTION/ DROPS OPHTHALMIC at 11:37

## 2025-04-03 RX ADMIN — KETOROLAC TROMETHAMINE 1 DROP: 5 SOLUTION OPHTHALMIC at 11:47

## 2025-04-03 SDOH — HEALTH STABILITY: MENTAL HEALTH: CURRENT SMOKER: 1

## 2025-04-03 ASSESSMENT — PAIN SCALES - GENERAL
PAINLEVEL_OUTOF10: 0 - NO PAIN
PAINLEVEL_OUTOF10: 0 - NO PAIN
PAIN_LEVEL: 0
PAINLEVEL_OUTOF10: 0 - NO PAIN

## 2025-04-03 ASSESSMENT — COLUMBIA-SUICIDE SEVERITY RATING SCALE - C-SSRS
1. IN THE PAST MONTH, HAVE YOU WISHED YOU WERE DEAD OR WISHED YOU COULD GO TO SLEEP AND NOT WAKE UP?: NO
6. HAVE YOU EVER DONE ANYTHING, STARTED TO DO ANYTHING, OR PREPARED TO DO ANYTHING TO END YOUR LIFE?: NO
2. HAVE YOU ACTUALLY HAD ANY THOUGHTS OF KILLING YOURSELF?: NO

## 2025-04-03 ASSESSMENT — PAIN - FUNCTIONAL ASSESSMENT
PAIN_FUNCTIONAL_ASSESSMENT: 0-10

## 2025-04-03 NOTE — ANESTHESIA POSTPROCEDURE EVALUATION
Patient: Jay Phoenix    Procedure Summary       Date: 04/03/25 Room / Location: [facility] OR 05 / Virtual [facility] OR    Anesthesia Start: 1227 Anesthesia Stop:     Procedure: PHACOEMULSIFICATION, CATARACT, WITH IOL INSERTION (Left: Eye) Diagnosis:       Combined forms of age-related cataract of left eye      (Combined forms of age-related cataract of left eye [H25.812])    Surgeons: Henok Bain MD Responsible Provider: Richard Yip DO    Anesthesia Type: MAC ASA Status: 3            Anesthesia Type: MAC    Vitals Value Taken Time   /68 04/03/25 1256   Temp 97.5 04/03/25 1256   Pulse 73 04/03/25 1256   Resp 16 04/03/25 1256   SpO2 97 04/03/25 1256       Anesthesia Post Evaluation    Patient location during evaluation: bedside  Patient participation: complete - patient participated  Level of consciousness: awake  Pain score: 0  Pain management: adequate  Multimodal analgesia pain management approach  Airway patency: patent  Cardiovascular status: acceptable  Respiratory status: acceptable and room air  Hydration status: acceptable  Postoperative Nausea and Vomiting: none  Comments: Easily awakens.  VSS.  Denies pain or nausea.      Tolerated well.  VSS.  No notable events documented.

## 2025-04-03 NOTE — H&P
H&P Notes  - documented in this encounter   Henok Bain MD - 03/26/2025 1:24 PM EDT  Formatting of this note is different from the original.  HISTORY AND PHYSICAL EXAMINATION    SERVICE DATE: 3/26/2025  SERVICE TIME:    PRIMARY CARE PHYSICIAN: Kinza Yadav, JOSELO-C, NP    REASON FOR VISIT:  Jay Phoenix is a 79 year old male who is being seen for combined age related cataract left eye    The patient has the following:  ACTIVE PROBLEM LIST  Tobacco Use Disorder  Chronic Solar Dermatitis  Right Leg Pain  Htn (Hypertension)  Vascular Bruit  Urinary Frequency  Decreased Pedal Pulses  Snoring  Atherosclerosis of Native Coronary Artery of Native Heart Without Angina Pectoris  Scalp Cyst  Hyperlipidemia  Combined Forms of Age-Related Cataract of Both Eyes  Meibomian Gland Dysfunction (Mgd) of Upper and Lower Lids of Both Eyes  Punctate Keratitis, Bilateral  Hyperopia of Both Eyes With Astigmatism and Presbyopia  CKD (chronic kidney disease) Stage 3, GFR 30-59 ml/min  Elevated Prostate Specific Antigen (Psa)    SUBJECTIVE  CHIEF COMPLAINT: Combined age related cataract left eye  HPI: Blurry vision, difficulty reading, watching television, difficulty with glare    PAST MEDICAL HISTORY  Diagnosis Date  BPH (benign prostatic hyperplasia)  Chronic solar dermatitis 08/14/2012  History of pneumonia 08/14/2012  HTN (hypertension)  Hyperlipidemia  Tobacco use disorder    PAST SURGICAL HISTORY  Procedure Laterality Date  PAST SURGICAL HISTORY OF 05/08/2018  Posterior scalp sebaceous cyst as well as posterior scalp skin tag.  PROSTATE NEEDLE BIOPSY ANY APPROACH 05/24/2019  Transrectal bx, prostate  PROSTATE NEEDLE BIOPSY ANY APPROACH  TOOTH EXTRACTION    FAMILY HISTORY  Problem Relation Age of Onset  Hypertension Father  other (unknown) Mother  mother still living as of 4/2018  None Sister  No Family History No Family History  No FH of CaP  No Ocular Disease No Family History    SOCIAL HISTORY:  Social History    Tobacco  Use  Smoking status: Every Day  Current packs/day: 0.50  Average packs/day: 0.5 packs/day for 63.2 years (31.6 ttl pk-yrs)  Types: Cigarettes  Start date: 1/1/1962  Smokeless tobacco: Never  Substance Use Topics  Alcohol use: Yes  Alcohol/week: 1.0 standard drink of alcohol  Types: 1 Shots of liquor per week  Drug use: No    MEDICATIONS:  Prior to Admission medications as of 2/28/25 1447  Medication Sig Last Dose Taking  tamsulosin (FLOMAX) 0.4 mg Take 1 capsule by mouth once daily.  fluorometholone (FML LIQUID FILM) 0.1 % ophthalmic suspension Use 1 Drop in both eyes three times a day.  atorvastatin (LIPITOR) 40 mg tablet take 1 tablet by mouth daily  amLODIPine (NORVASC) 5 mg tablet take 1 tablet once daily  aspirin, enteric coated (ASPIRIN, ENTERIC COATED) 81 mg EC tablet Take 81 mg by mouth once daily.    No medication comments found.    CURRENT ALLERGIES: ALLERGIES  No Known Allergies    REVIEW OF SYSTEMS:  PAIN ASSESSMENT:  General: No weight loss, malaise or fevers.  Neuro: No Hx of stroke or seizures  Respiratory: No history of current cough or dyspnea, or pneumonia in the past 6 weeks. No history of respiratory/pulmonary symptoms or problems  Cardiovascular: Positive for: Hypertension, history of heart attack  GI: No history of GI symptoms or problems. No history of esophageal varices, recent ascites, or ETOH greater than 2 drinks per day.  : No history of UTI in past 6 weeks. No history of renal failure. Not currently on or requiring dialysis. No history of  symptoms or problems.  GYN: Negative for abnormal vaginal bleeding, abnormal vaginal discharge., N/A  Pregnancy: N/A  Endocrine: No history of diabetes. Has not taken steroids within the past 30 days. No history of endocrinological symptoms or problems.  Hematology: Chronic anti-coagulation / platelet meds (Aspirin)  Oncology: No history of CA metastasis, chemo within 30 days, or radiotherapy within 90 days. Has not lost 10% of body wt in 6 months.  No history of oncological symptoms or problems.  Psych: No history of psychiatric symptoms or problems.  Musculoskeletal: Negative for joint pain or swelling, back pain or muscle pain.  Skin: Negative for lesions, rash and itching.    PHYSICAL EXAM:  VITALS:  /87  Pulse 97        General: Alert and oriented  Skin: Normal color, no rash, no lesions.  HEENT: EOM, pupils equal, round and reactive.  Cardiovascular: Normal S1 & S2, no rubs, murmurs or gallops. No JVD. Pulse regular.  Lungs: Normal breath sounds, no wheezes or crackles.  Abdomen: Soft, non-tender, no rigidity.  Extremities: No deformity, no edema or tenderness, no joint swelling or clubbing.  Neurological: Normal cognition and motor skills.  Pulses: Carotid and radial pulses normal +2.    Diagnostic tests reviewed for today's visit:  No new labs    ASSESSMENT  Medication and Non-Pharmacologic VTE Prophylaxis/Anticoagulants      VTE Prophylaxis: VTE prophylaxis appropriate    Impression: There is no known pertinent medical condition which may affect fariba-operative course        Clinical Risk Factors for Possible Cardiac Complications:  None    Patient is scheduled for a low-risk procedure.    FUNCTIONAL STATUS: Walk indoors, such as around the house (1.75 METs)    Functional Class (NYHA): N/A    [facility]: Not obtained    PLAN  CONSULTS:  Patient does not require consults for optimization at this time.    The Following Tests/Procedures Have Been Initiated:  None    Instructions Given to Patient:  Patient given verbal and written preop instructions and voices comprehension and compliance.    SIGNATURE: Henok Bain MD PATIENT NAME: Jay Phoenix  DATE: March 26, 2025 MRN: [id number]  TIME: 1:24 PM PAGER/CONTACT #:    Electronically signed by Henok Bain MD at 03/26/2025 1:35 PM EDT   Source Comments  - [facility]   In the event this information is protected by the [facility] Confidentiality of Alcohol and Drug Abuse Patient Records  regulations: This information has been disclosed to you from records protected by Federal confidentiality rules (42 CFR Part 2). The Federal rules prohibit you from making any further disclosure of this information unless further disclosure is expressly permitted by the written consent of the person to whom it pertains or as otherwise permitted by 42 CFR Part 2. A general authorization for the release of medical or other information is NOT sufficient for this purpose. The Federal rules restrict any use of the information to criminally investigate or prosecute any alcohol or drug abuse patient.  Reason for Visit    Reason for Visit -  Reason Comments   Cataract Follow Up       Encounter Details    Encounter Details  Date Type Department Care Team (Latest Contact Info) Description   03/26/2025 1:15 PM EDT Office Visit [facility] Ophthalmology   [address] [place] [address]   [phone]  Henok Bain MD   [address] [place] [address]   [phone] (Work)   [phone] (Fax)  Combined form of age-related cataract, left eye (Primary Dx);  Combined form of age-related cataract, right eye;  Pupillary miosis;  Regular astigmatism of left eye;  Vitreomacular traction syndrome of left eye;  Essential hypertension;  Hypercholesteremia;  Benign prostatic hyperplasia, unspecified whether lower urinary tract symptoms present     Social History  - documented as of this encounter   Social History  Tobacco Use Types Packs/Day Years Used Date   Smoking Tobacco: Every Day Cigarettes 0.5 63.2 Started: 01/01/1962   Smokeless Tobacco: Never             Social History  Alcohol Use Standard Drinks/Week Comments   Yes 1 (1 standard drink = 0.6 oz pure alcohol)       Social History  PHQ-2 Answer Date Recorded   PHQ-2 score 0 02/06/2023     Social History  Area Deprivation Index Answer Date Recorded   National Score (1-100), lower number is lower risk 50 12/20/2023   State Score (1-10), lower number is lower risk 3  12/20/2023   Data from: [email or web address]/. 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  Functional Status - Are you deaf or do you have serious difficulty hearing?  Answer Date of Assessment Author   No 11/21/2014 2:32 PM Manju Woodruff LPN        Are you blind or do you have serious difficulty seeing, even when wearing glasses?  Functional Status - Are you blind or do you have serious difficulty seeing, even when wearing glasses?  Answer Date of Assessment Author   No 11/21/2014 2:32 PM Manju Woodruff LPN        Do you have serious difficulty walking or climbing stairs?  Functional Status - Do you have serious difficulty walking or climbing stairs?  Answer Date of Assessment Author   No 11/21/2014 2:32 PM Manju Woodruff LPN        Do you have difficulty dressing or bathing?  Functional Status - Do you have difficulty dressing or bathing?  Answer Date of Assessment Author   No 11/21/2014 2:32 PM Manju Woodruff LPN        Because of a physical, mental, or emotional condition, do you have difficulty doing errands alone  such as visiting a doctor's office or shopping?  Functional Status - Because of a physical, mental, or emotional condition, do you have difficulty doing errands alone such as visiting a doctor's office or shopping?  Answer Date of Assessment Author   No 11/21/2014 2:32 PM Manju Woodruff LPN     Mental Status  - documented in this encounter   Because of a physical, mental, or emotional condition, do you have serious difficulty concentrating, remembering, or making decisions?  Mental Status - Because of a physical, mental, or emotional condition, do you have serious difficulty concentrating, remembering, or making decisions?  Answer Entry Date Author   No 11/21/2014 2:32 PM Manju Woodruff LPN     Patient Instructions  - documented in this encounter   Patient Instructions  Henok Bain MD - 03/26/2025 1:03 PM EDT   Formatting of this note is different from the original.  Cataract surgery left eye is scheduled on 4/10/25    Start  Current Ophthalmic Meds      fluorometholone (FML LIQUID FILM) 0.1 % ophthalmic suspension Use 1 Drop in both eyes three times a day.    Ivizia 1 drop three times a day both eyes    If you have any questions please contact our office at [phone].  After office hours or on the weekend, please call Dr. Bain on his cell phone at [phone].  Electronically signed by Henok Bain MD at 03/26/2025 1:03 PM EDT     Ordered Prescriptions  - documented in this encounter  Reconcile with Patient's Chart  Ordered Prescriptions  Prescription Sig Dispense Quantity Refills Last Filled Start Date End Date   fluorometholone (FML LIQUID FILM) 0.1 % ophthalmic suspension  Use 1 Drop in both eyes three times a day. 5 mL  2   03/26/2025       Progress Notes  - documented in this encounter   Henok Bain MD - 03/26/2025 1:00 PM EDT  Formatting of this note is different from the original.  We asked patient's wife to be present during exam, she denied. We asked patient  to have his wife in the exam room during examination for questions/answers. Patient declined.    Patient is non-compliant with eye drops    ASSESSMENT/PLAN:  1. Combined form of age-related cataract, left eye - ICD9: 366.19, ICD10: H25.812 (primary diagnosis)    PHYSICAL EXAM:    Vital Signs:  Blood pressure 166/87, pulse 97.    Respiratory:  Normal breath sounds, no wheezing.    CARD:  Normal heart sounds 1 & 2, normal sinus rhythm.      Jay Phoenix has confirmed that he is no longer able to function adequately on a day-to-day basis because of his current visual condition.    Further, it is my medical opinion that the cataract is the primary cause, or at least a significant cause of his visual dysfunction. Other eye diseases have been ruled out as primary cause of decreased vision. It is my expectation that visual function and quality of life will improve significantly with cataract extraction and intraocular lens implantation.    The risks, benefits, alternatives, and complications of cataract surgery with intraocular lens implantation were discussed with Jay Phoenix in detail. Jay Phoenix appeared to understand and asked that I proceed with plans for cataract surgery.    Upon eye examination, patient was found to have a visually significant cataract left eye . Discussed cataract surgery with patient and different intraocular lens implant options with patient: basic monofocal intraocular lens implant, Toric intraocular lens implant, and presbyopia correction intraocular lens implant. In my medical opinion, based on medical history and ocular examination, cataract surgery with intraocular lens implant will correct patient's vision and improve quality of patient's daily living activities. Patient wishes to have traditional cataract surgery with basic intraocular lens left eye 04/10/2025. Patient wishes to have cataract surgery with the option stated above. Patient understands that an intraocular lens  implant does not necessarily replace the need for glasses. Patient understands that it is impossible for the surgeon to inform him/her of every possible complication that may occur. The surgeon has answered all of the patient's questions. Patient understands that if he/she has a mature or dense cataract, pseudoexfoliation cataract, or history of use of Flomax, he/she may require the use of Maluyugin Ring and/or Vision Blue during surgery. Patient understands the risks, benefits, and alternatives to surgery.    A complete, comprehensive eye examination and biometry has been performed on Jay Phoenix.    Continue:  Current Ophthalmic Meds      fluorometholone (FML LIQUID FILM) 0.1 % ophthalmic suspension Use 1 Drop in both eyes three times a day.    Ivizia 1 drop three times a day both eyes    2. Combined form of age-related cataract, right eye - ICD9: 366.19, ICD10: H25.811    - Plan cataract surgery right eye after left eye is completed and stable    3. 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 Regular astigmatism of left eye - ICD9: 367.21, ICD10: H52.222  - Patient educated on toric Intraocular lens vs standard monofocal Intraocular lens. Patient declines toric and understands he will need to wear glasses post operatively for the correction of astigmatism    5. Vitreomacular traction syndrome of left eye - ICD9: 379.27, ICD10: H43.822  - Monitor    6. Essential hypertension - ICD9: 401.9, ICD10: I10  7. Hypercholesteremia - ICD9: 272.0, ICD10: E78.00  8. Benign prostatic hyperplasia, unspecified whether lower urinary tract symptoms present - ICD9: 600.00, ICD10: N40.0  - Continue to monitor with primary care physician.    I have confirmed and edited as necessary the relevant HPI, ophthalmic history, ROS, and the neuro exam findings as  obtained by others. I have seen and examined Jay Phoenix.  I have discussed the case and the management of this patient's care with the Resident/Fellow, if applicable. I also have reviewed and agree with the assessment and plan as stated above and agree with all of its relevant components.        Electronically signed by Henok Bain MD at 03/26/2025 1:35 PM EDT   Plan of Treatment  - documented as of this encounter   Plan of Treatment - Upcoming Encounters  Upcoming Encounters  Date Type Department Care Team (Latest Contact Info) Description   04/03/2025 1:50 PM EDT Hospital Encounter   Henok Bain MD   [address] [place] [address]   [phone] (Work)   [phone] (Fax)  Combined forms of age-related cataract of left eye [H25.812]   04/03/2025 1:50 PM EDT - 04/03/2025 2:05 PM EDT Surgery [address] [address]  Henok Bain MD   [address] [place] [address]   [phone] (Work)   [phone] (Fax)  SURGERY AT [facility]   04/04/2025 8:45 AM EDT Office Visit [facility] Ophthalmology   [address] [place] [address]   [phone]  Henok Bain MD   [address] [place] [address]   [phone] (Work)   [phone] (Fax)  1 day post op 1st le basic time per [facility] [place]     Plan of Treatment - Scheduled Procedures  Scheduled Procedures  Name Priority Associated Diagnoses Date/Time   SURGERY AT [facility] FACILITY   Combined forms of age-related cataract of left eye  04/03/2025 1:50 PM EDT     Procedures  - documented in this encounter   Procedures  Procedure Name Priority Date/Time Associated Diagnosis Comments   IOL BIOMETRY W/ IOL CALC OU (BOTH EYES) Routine 03/26/2025 1:35 PM EDT Combined form of age-related cataract, left eye   Combined form of age-related cataract, right eye  Results for this procedure are in the results section.      Imaging Results  - documented in this encounter   IOL BIOMETRY W/ IOL CALC OU (BOTH EYES)  (03/26/2025 1:35 PM EDT)  Imaging Results - IOL BIOMETRY W/ IOL CALC OU (BOTH EYES) (03/26/2025 1:35 PM EDT)  Anatomical Region Laterality Modality       Other     Imaging Results - IOL BIOMETRY W/ IOL CALC OU (BOTH EYES) (03/26/2025 1:35 PM EDT)  Narrative   03/26/2025 1:35 PM EDT   Date of Procedure  3/26/2025.    Notes  Measurements only - see Procedure Record under Scanned Documents for  signed results.    LENSTAR  Right eye:  AL 23.39 ACD 2.64 WTW 11.59  Left eye:     AL 23.14 ACD 2.64 WTW 11.74       Imaging Results - IOL BIOMETRY W/ IOL CALC OU (BOTH EYES) (03/26/2025 1:35 PM EDT)  Authorizing Provider Result Type Result Status   Henok Bain MD OPHTHALMOLOGY Final Result     Associated Images       3/26/2025  IOL BIOMETRY W/ IOL CALC OU (BOTH EYES)     Visit Diagnoses  - documented in this encounter   Visit Diagnoses  Diagnosis   Combined form of age-related cataract, left eye - Primary    Combined form of age-related cataract, right eye    Pupillary miosis   Miosis (persistent), not due to miotics    Regular astigmatism of left eye   Regular astigmatism    Vitreomacular traction syndrome of left eye    Essential hypertension   Unspecified essential hypertension    Hypercholesteremia   Pure hypercholesterolemia    Benign prostatic hyperplasia, unspecified whether lower urinary tract symptoms present    Combined forms of age-related cataract of left eye   Other and combined forms of senile cataract      Discontinued Medications  - documented as of this encounter   Discontinued Medications  Medication Sig Discontinue Reason Start Date End Date   fluorometholone (FML LIQUID FILM) 0.1 % ophthalmic suspension  Use 1 Drop in both eyes three times a day.   02/28/2025 03/26/2025     Orders  - documented in this encounter   Orders  Medications Ordered That Might Not Have Been Administered Count Last Ordered Date First Ordered Date   proparacaine 0.5 % 1 Drop (ALCAINE) 2 03/26/2025       Eye Exam    Eye Exam -  Visual Acuity (Snellen - Linear)  Visual Acuity (Snellen - Linear)    Right eye Left eye   Dist sc 20/50 20/50   Dist cc 20/70 20/70   Near cc J4 J4     Eye Exam - Tonometry (Applanation, 1:23 PM)  Tonometry (Applanation, 1:23 PM)    Right eye Left eye   Pressure 12 12     Eye Exam - Pupils  Pupils    Pupils Dark Shape React APD   Right eye PERRL 3 Round +2 None   Left eye PERRL 3 Round +2 None     Eye Exam - Visual Fields  Visual Fields    Right eye Left eye     Full Full     Eye Exam - Extraocular Movement  Extraocular Movement    Right eye Left eye     Full Full     Eye Exam - Neuro/Psych  Neuro/Psych  Oriented x3: Yes   Mood/Affect: Normal     Eye Exam - External Exam  External Exam    Right eye Left eye   External Normal including orbits and preauricular lymph nodes Normal including orbits and preauricular lymph nodes     Eye Exam - Slit Lamp Exam  Slit Lamp Exam    Right eye Left eye   Lids/Lashes pigmented lesion lower lid pigmented lesion lower lid   Conjunctiva/Sclera White and quiet White and quiet   Cornea Punctate epithelial keratitis Punctate epithelial keratitis   Anterior Chamber Deep and quiet Deep and quiet   Iris Round and reactive Round and reactive   Lens 3+ Nuclear sclerotic cataract, 4+ Posterior subcapsular cataract 3+ Nuclear sclerotic cataract, 4+ Posterior subcapsular cataract   Anterior Vitreous Normal Normal     Eye Exam - Fundus Exam  Fundus Exam    Right eye Left eye   Disc Normal Normal   C/D Ratio 0.2 0.2   Macula Normal Normal   Vessels Normal Normal   Periphery Normal Normal     Eye Exam - Wearing Rx  Wearing Rx    Sphere Cylinder Axis   Right eye +0.50 +0.50 010   Left eye +0.25 +1.00 100     Care Teams  - documented as of this encounter   Care Teams  Team Member Relationship Specialty Start Date End Date   Kinza Yadav NP   NPI: [id number]   [address] [place] [address]   [phone] (Work)   [phone] (Fax)  PCP - General Family Medicine 2/7/25     Jonathan  MINNIE Renee MD   NPI: [id number]   [address] [place] [address]   [phone] (Work)   [phone] (Fax)  Primary Staff Physician Cardiology 3/19/19

## 2025-04-03 NOTE — ANESTHESIA PREPROCEDURE EVALUATION
Patient: Jay Phoenix    Procedure Information       Date/Time: 04/03/25 1230    Procedure: PHACOEMULSIFICATION, CATARACT, WITH IOL INSERTION (Left: Eye)    Location: [facility] OR 05 / Virtual [facility] OR    Surgeons: Henok Bain MD            Relevant Problems   Anesthesia   (+) PONV (postoperative nausea and vomiting)   (-) Family history of malignant hyperthermia   (-) Malignant hyperthermia      Cardiac   (+) CAD (coronary artery disease)   (+) HTN (hypertension)   (+) MI (myocardial infarction) (Multi)      Pulmonary  Smoker 1/2 ppd      Neuro (within normal limits)      GI (within normal limits)      Liver (within normal limits)      Endocrine (within normal limits)      Hematology (within normal limits)      Musculoskeletal (within normal limits)      HEENT   (+) Cataract present       Clinical information reviewed:   Tobacco  Allergies  Meds   Med Hx  Surg Hx   Fam Hx  Soc Hx        NPO Detail:  NPO/Void Status  Carbohydrate Drink Given Prior to Surgery? : N  Date of Last Liquid: 04/02/25  Time of Last Liquid: 2000  Date of Last Solid: 04/02/25  Time of Last Solid: 2000  Last Intake Type: Clear fluids  Time of Last Void: 1000         Physical Exam    Airway  Mallampati: II  TM distance: >3 FB     Cardiovascular   Rhythm: regular  Rate: normal     Dental   (+) lower dentures, upper dentures     Pulmonary - normal exam     Abdominal            Anesthesia Plan    History of general anesthesia?: yes  History of complications of general anesthesia?: no    ASA 3     MAC     The patient is a current smoker.    intravenous induction   Anesthetic plan and risks discussed with patient.    MAC discussed with Patient.  Plan and process discussed for anesthesia for procedure.  Sedation/Block .  Risks and benefits discussed.  Need for cooperation with the surgeon for the possible block per surgeon and procedure. All questions answered with patient.  The patient understands plan and wishes to proceed.

## 2025-04-03 NOTE — DISCHARGE INSTRUCTIONS
Please see enclosed instructions from Dr. Bain regarding eye drop schedule, restrictions and use of eye shield.    Please take bag with eye drops that were given to you today as well as ALL eye drops that you are using at home with you to your appointment tomorrow at Dr. Bain's office.

## 2025-04-03 NOTE — OP NOTE
PHACOEMULSIFICATION, CATARACT, WITH IOL INSERTION (L) Operative Note     Date: 4/3/2025  OR Location: [facility] OR    Name: Jay Phoenix : 1945, Age: 79 y.o., MRN: [id number], Sex: male    Diagnosis  Pre-op Diagnosis      * Combined forms of age-related cataract of left eye [H25.812] Post-op Diagnosis     * Combined forms of age-related cataract of left eye [H25.812]     Procedures  PHACOEMULSIFICATION, CATARACT, WITH IOL INSERTION  95201 - [facility] XCAPSL CTRC RMVL INSJ IO LENS PROSTH W/O ECP      Surgeons      * Henok Bain - Primary    Resident/Fellow/Other Assistant:  Surgeons and Role:  * No surgeons found with a matching role *    Staff:   Circulator: Karolina Fay Person: Tamera    Anesthesia Staff: Anesthesiologist: Richard Yip DO    Procedure Summary  Anesthesia: Monitor Anesthesia Care  ASA: III  Estimated Blood Loss: None  Intra-op Medications:   Administrations occurring from 1230 to 1310 on 25:   Medication Name Total Dose   lidocaine-epinephrine (Xylocaine W/EPI) 1 %-1:100,000 injection 1 mL   moxifloxacin (Vigamox) 1.5 mg/1 mL (0.15%) injection 1.5 mg 1.5 mg   prednisoLONE acetate (Pred-Forte) 1 % ophthalmic suspension 1 drop 1 drop   LR infusion Cannot be calculated   lidocaine PF (Xylocaine-MPF) local injection 1 % 30 mg   propofol (Diprivan) injection 10 mg/mL 10 mg     Anesthesia Record        Intraprocedure I/O Totals          Intake    LR infusion 200.00 mL    Total Intake 200 mL     Specimen: No specimens collected      Drains and/or Catheters: * None in log *    I have reviewed the images and report from the Ophthalmic Biometry 4-3-25 to determine the intraocular lens power calculation for the IOL lens implant.  I have interpreted and agree with the calculation of the IOL as listed below.    Implants:  Implants       Type Name Action Serial No.      Lens LENS, INTRAOCULAR, CCA0T0 22.5 - [id number] - [id number] Implanted [id number]        Findings: Combined Form Age Related Cataract  Left     Indications: Jay Phoenix is an 79 y.o. male who is having surgery for Combined forms of age-related cataract of left eye [H25.812]. Decreased vision in the left eye for near and for distance.  Difficulty seeing to read and watch TV left eye.  Vision in the left eye impedes patients ability to perform daily activities.     The patient was seen in the preoperative area. The risks, benefits, complications, treatment options, non-operative alternatives, expected recovery and outcomes were discussed with the patient. The possibilities of reaction to medication, pulmonary aspiration, injury to surrounding structures, bleeding, recurrent infection, the need for additional procedures, failure to diagnose a condition, and creating a complication requiring transfusion or operation were discussed with the patient. The patient concurred with the proposed plan, giving informed consent.  The site of surgery was properly noted/marked if necessary per policy. The patient has been actively warmed in preoperative area. Preoperative antibiotics are not indicated. Venous thrombosis prophylaxis are not indicated.    Procedure Details: The patient was correctly identified in the preop area and the operative eye was marked with a marking pen. The operative eye was dilated in the preoperative area.  The patient was then taken to the operating room where timeout was performed before starting the procedure. Combined anesthesia  with intravenous sedation and topical tetracaine eyedrops were given the left eye. The operative eye was prepped and draped in the standard sterile ophthalmic fashion in  preparation for ophthalmic surgery.  A [place] wire speculum was then inserted between the eyelids of the left eye and the operating microscope was placed over the left eye.  A paracentesis incision was made approximately 30° away from the planned surgical incision site with the help of MVR blade.  1% lidocaine MPF with Phenylephrine  1.5% PF was injected into the anterior chamber through the paracentesis incision. A near limbal clear corneal incision was fashioned in the temporal quadrant just outside the vascular arcade and Viscoat was injected into anterior chamber to firm the eye. A bent needle cystotome was used and Utrata forceps were utilized to create a continuous curvilinear capsulorrhexis.  BSS was injected beneath the anterior capsule to hydrodissect the nucleus from adjacent cortex and capsule.  The residual cortex were then aspirated with irrigation aspiration handpiece. The posterior capsule was then polished with the help of soft irrigation-aspiration tip.  Provisc viscoelastic was then injected into the eye to reform the anterior chamber and to open the capsular bag.  The intraocular lens implant was taken from its sterile wrapping, inspected under the surgical microscope and found to be in good condition. The intraocular lens implant +22.5D was injected into the capsule bag. The Provisc was then aspirated from the anterior chamber and from behind the intraocular lens implant.  The anterior chamber was inflated with the help of BSS to moderate tension.  The edges of the surgical incision were then hydrated with the help of BSS.  Vigamox was then injected into the anterior chamber and into the capsule bag through the paracentesis incision. The surgical wound was then inspected and found to be watertight.  The wire speculum and drapes were then removed.  Pred Forte eyedrops, Acular eyedrops and Betadine 5% sterile ophthalmic solution were instilled in the conjunctival sac.     The patient tolerated the procedure well and was taken to recovery room in stable condition.    Complications:  None; patient tolerated the procedure well.    Disposition:  Home  Condition: stable     Attending Attestation: I performed the procedure.    Patient will be co-managed with his Optometrist    Henok Bain  Phone Number: [phone]

## 2025-05-01 ENCOUNTER — PREP FOR PROCEDURE (OUTPATIENT)
Dept: OPERATING ROOM | Facility: HOSPITAL | Age: 80
End: 2025-05-01
Payer: MEDICARE

## 2025-05-01 DIAGNOSIS — H25.811 COMBINED FORMS OF AGE-RELATED CATARACT OF RIGHT EYE: Primary | ICD-10-CM

## 2025-05-01 RX ORDER — KETOROLAC TROMETHAMINE 5 MG/ML
1 SOLUTION OPHTHALMIC
OUTPATIENT
Start: 2025-05-01 | End: 2025-05-01

## 2025-05-01 RX ORDER — TETRACAINE HYDROCHLORIDE 5 MG/ML
1 SOLUTION OPHTHALMIC ONCE
OUTPATIENT
Start: 2025-05-01 | End: 2025-05-01

## 2025-05-01 RX ORDER — PREDNISOLONE ACETATE 10 MG/ML
1 SUSPENSION/ DROPS OPHTHALMIC ONCE
OUTPATIENT
Start: 2025-05-01 | End: 2025-05-01

## 2025-05-01 RX ORDER — POVIDONE-IODINE 5 %
SOLUTION, NON-ORAL OPHTHALMIC (EYE) ONCE
OUTPATIENT
Start: 2025-05-01 | End: 2025-05-01

## 2025-05-07 ENCOUNTER — ANESTHESIA EVENT (OUTPATIENT)
Dept: OPERATING ROOM | Facility: HOSPITAL | Age: 80
End: 2025-05-07
Payer: MEDICARE

## 2025-05-08 ENCOUNTER — ANESTHESIA (OUTPATIENT)
Dept: OPERATING ROOM | Facility: HOSPITAL | Age: 80
End: 2025-05-08
Payer: MEDICARE

## 2025-05-08 ENCOUNTER — HOSPITAL ENCOUNTER (OUTPATIENT)
Facility: HOSPITAL | Age: 80
Setting detail: OUTPATIENT SURGERY
Discharge: HOME | End: 2025-05-08
Attending: OPHTHALMOLOGY | Admitting: OPHTHALMOLOGY
Payer: MEDICARE

## 2025-05-08 VITALS
SYSTOLIC BLOOD PRESSURE: 131 MMHG | OXYGEN SATURATION: 97 % | HEIGHT: 63 IN | BODY MASS INDEX: 27.11 KG/M2 | WEIGHT: 153 LBS | RESPIRATION RATE: 16 BRPM | DIASTOLIC BLOOD PRESSURE: 64 MMHG | TEMPERATURE: 97.9 F | HEART RATE: 67 BPM

## 2025-05-08 PROBLEM — N40.0 BPH (BENIGN PROSTATIC HYPERPLASIA): Status: ACTIVE | Noted: 2025-05-08

## 2025-05-08 PROCEDURE — 2500000005 HC RX 250 GENERAL PHARMACY W/O HCPCS: Performed by: OPHTHALMOLOGY

## 2025-05-08 PROCEDURE — 2500000004 HC RX 250 GENERAL PHARMACY W/ HCPCS (ALT 636 FOR OP/ED): Performed by: ANESTHESIOLOGY

## 2025-05-08 PROCEDURE — 2720000007 HC OR 272 NO HCPCS: Performed by: OPHTHALMOLOGY

## 2025-05-08 PROCEDURE — 3700000001 HC GENERAL ANESTHESIA TIME - INITIAL BASE CHARGE: Performed by: OPHTHALMOLOGY

## 2025-05-08 PROCEDURE — 2500000001 HC RX 250 WO HCPCS SELF ADMINISTERED DRUGS (ALT 637 FOR MEDICARE OP): Performed by: OPHTHALMOLOGY

## 2025-05-08 PROCEDURE — 7100000010 HC PHASE TWO TIME - EACH INCREMENTAL 1 MINUTE: Performed by: OPHTHALMOLOGY

## 2025-05-08 PROCEDURE — 7100000009 HC PHASE TWO TIME - INITIAL BASE CHARGE: Performed by: OPHTHALMOLOGY

## 2025-05-08 PROCEDURE — 2760000001 HC OR 276 NO HCPCS: Performed by: OPHTHALMOLOGY

## 2025-05-08 PROCEDURE — 3600000008 HC OR TIME - EACH INCREMENTAL 1 MINUTE - PROCEDURE LEVEL THREE: Performed by: OPHTHALMOLOGY

## 2025-05-08 PROCEDURE — V2632 POST CHMBR INTRAOCULAR LENS: HCPCS | Performed by: OPHTHALMOLOGY

## 2025-05-08 PROCEDURE — 2500000004 HC RX 250 GENERAL PHARMACY W/ HCPCS (ALT 636 FOR OP/ED): Performed by: OPHTHALMOLOGY

## 2025-05-08 PROCEDURE — 3600000003 HC OR TIME - INITIAL BASE CHARGE - PROCEDURE LEVEL THREE: Performed by: OPHTHALMOLOGY

## 2025-05-08 PROCEDURE — 3700000002 HC GENERAL ANESTHESIA TIME - EACH INCREMENTAL 1 MINUTE: Performed by: OPHTHALMOLOGY

## 2025-05-08 DEVICE — IMPLANTABLE DEVICE: Type: IMPLANTABLE DEVICE | Site: EYE | Status: FUNCTIONAL

## 2025-05-08 RX ORDER — SODIUM CHLORIDE, SODIUM LACTATE, POTASSIUM CHLORIDE, CALCIUM CHLORIDE 600; 310; 30; 20 MG/100ML; MG/100ML; MG/100ML; MG/100ML
50 INJECTION, SOLUTION INTRAVENOUS CONTINUOUS
Status: DISCONTINUED | OUTPATIENT
Start: 2025-05-08 | End: 2025-05-08 | Stop reason: HOSPADM

## 2025-05-08 RX ORDER — ONDANSETRON HYDROCHLORIDE 2 MG/ML
4 INJECTION, SOLUTION INTRAVENOUS ONCE
Status: COMPLETED | OUTPATIENT
Start: 2025-05-08 | End: 2025-05-08

## 2025-05-08 RX ORDER — MIDAZOLAM HYDROCHLORIDE 1 MG/ML
INJECTION INTRAMUSCULAR; INTRAVENOUS AS NEEDED
Status: DISCONTINUED | OUTPATIENT
Start: 2025-05-08 | End: 2025-05-08

## 2025-05-08 RX ORDER — SODIUM CHLORIDE, SODIUM LACTATE, POTASSIUM CHLORIDE, CALCIUM CHLORIDE 600; 310; 30; 20 MG/100ML; MG/100ML; MG/100ML; MG/100ML
20 INJECTION, SOLUTION INTRAVENOUS CONTINUOUS
Status: DISCONTINUED | OUTPATIENT
Start: 2025-05-08 | End: 2025-05-08 | Stop reason: HOSPADM

## 2025-05-08 RX ORDER — ONDANSETRON HYDROCHLORIDE 2 MG/ML
4 INJECTION, SOLUTION INTRAVENOUS ONCE AS NEEDED
Status: DISCONTINUED | OUTPATIENT
Start: 2025-05-08 | End: 2025-05-08 | Stop reason: HOSPADM

## 2025-05-08 RX ORDER — KETOROLAC TROMETHAMINE 5 MG/ML
1 SOLUTION OPHTHALMIC
Status: COMPLETED | OUTPATIENT
Start: 2025-05-08 | End: 2025-05-08

## 2025-05-08 RX ORDER — PREDNISOLONE ACETATE 10 MG/ML
1 SUSPENSION/ DROPS OPHTHALMIC ONCE
Status: COMPLETED | OUTPATIENT
Start: 2025-05-08 | End: 2025-05-08

## 2025-05-08 RX ORDER — METOCLOPRAMIDE HYDROCHLORIDE 5 MG/ML
10 INJECTION INTRAMUSCULAR; INTRAVENOUS ONCE AS NEEDED
Status: DISCONTINUED | OUTPATIENT
Start: 2025-05-08 | End: 2025-05-08 | Stop reason: HOSPADM

## 2025-05-08 RX ORDER — MIDAZOLAM HYDROCHLORIDE 1 MG/ML
1 INJECTION INTRAMUSCULAR; INTRAVENOUS ONCE
Status: COMPLETED | OUTPATIENT
Start: 2025-05-08 | End: 2025-05-08

## 2025-05-08 RX ORDER — POVIDONE-IODINE 5 %
SOLUTION, NON-ORAL OPHTHALMIC (EYE) ONCE
Status: COMPLETED | OUTPATIENT
Start: 2025-05-08 | End: 2025-05-08

## 2025-05-08 RX ORDER — TETRACAINE HYDROCHLORIDE 5 MG/ML
1 SOLUTION OPHTHALMIC ONCE
Status: COMPLETED | OUTPATIENT
Start: 2025-05-08 | End: 2025-05-08

## 2025-05-08 RX ADMIN — KETOROLAC TROMETHAMINE 1 DROP: 5 SOLUTION OPHTHALMIC at 11:55

## 2025-05-08 RX ADMIN — SODIUM CHLORIDE, SODIUM LACTATE, POTASSIUM CHLORIDE, AND CALCIUM CHLORIDE 20 ML/HR: .6; .31; .03; .02 INJECTION, SOLUTION INTRAVENOUS at 11:50

## 2025-05-08 RX ADMIN — MIDAZOLAM HYDROCHLORIDE 0.25 MG: 1 INJECTION, SOLUTION INTRAMUSCULAR; INTRAVENOUS at 13:07

## 2025-05-08 RX ADMIN — POLYVINYL ALCOHOL, POVIDONE 1 DROP: 14; 6 SOLUTION/ DROPS OPHTHALMIC at 11:50

## 2025-05-08 RX ADMIN — PHENYLEPHRINE HYDROCHLORIDE 1 DROP: 100 SOLUTION/ DROPS OPHTHALMIC at 11:55

## 2025-05-08 RX ADMIN — MIDAZOLAM HYDROCHLORIDE 0.25 MG: 1 INJECTION, SOLUTION INTRAMUSCULAR; INTRAVENOUS at 13:12

## 2025-05-08 RX ADMIN — POLYVINYL ALCOHOL, POVIDONE 1 DROP: 14; 6 SOLUTION/ DROPS OPHTHALMIC at 11:35

## 2025-05-08 RX ADMIN — TETRACAINE HYDROCHLORIDE 1 DROP: 5 SOLUTION OPHTHALMIC at 11:35

## 2025-05-08 RX ADMIN — MIDAZOLAM HYDROCHLORIDE 1 MG: 1 INJECTION, SOLUTION INTRAMUSCULAR; INTRAVENOUS at 13:02

## 2025-05-08 RX ADMIN — POLYVINYL ALCOHOL, POVIDONE 1 DROP: 14; 6 SOLUTION/ DROPS OPHTHALMIC at 11:55

## 2025-05-08 RX ADMIN — POLYVINYL ALCOHOL, POVIDONE 1 DROP: 14; 6 SOLUTION/ DROPS OPHTHALMIC at 11:45

## 2025-05-08 RX ADMIN — PHENYLEPHRINE HYDROCHLORIDE 1 DROP: 100 SOLUTION/ DROPS OPHTHALMIC at 11:50

## 2025-05-08 RX ADMIN — KETOROLAC TROMETHAMINE 1 DROP: 5 SOLUTION OPHTHALMIC at 11:50

## 2025-05-08 RX ADMIN — KETOROLAC TROMETHAMINE 1 DROP: 5 SOLUTION OPHTHALMIC at 11:35

## 2025-05-08 RX ADMIN — MIDAZOLAM HYDROCHLORIDE 0.25 MG: 1 INJECTION, SOLUTION INTRAMUSCULAR; INTRAVENOUS at 13:22

## 2025-05-08 RX ADMIN — MIDAZOLAM HYDROCHLORIDE 0.25 MG: 1 INJECTION, SOLUTION INTRAMUSCULAR; INTRAVENOUS at 13:17

## 2025-05-08 RX ADMIN — POVIDONE-IODINE: 5 SOLUTION OPHTHALMIC at 11:36

## 2025-05-08 RX ADMIN — KETOROLAC TROMETHAMINE 1 DROP: 5 SOLUTION OPHTHALMIC at 11:45

## 2025-05-08 RX ADMIN — PHENYLEPHRINE HYDROCHLORIDE 1 DROP: 100 SOLUTION/ DROPS OPHTHALMIC at 11:35

## 2025-05-08 RX ADMIN — ONDANSETRON 4 MG: 2 INJECTION, SOLUTION INTRAMUSCULAR; INTRAVENOUS at 12:07

## 2025-05-08 RX ADMIN — PHENYLEPHRINE HYDROCHLORIDE 1 DROP: 100 SOLUTION/ DROPS OPHTHALMIC at 11:45

## 2025-05-08 SDOH — HEALTH STABILITY: MENTAL HEALTH: CURRENT SMOKER: 1

## 2025-05-08 ASSESSMENT — PAIN - FUNCTIONAL ASSESSMENT
PAIN_FUNCTIONAL_ASSESSMENT: 0-10

## 2025-05-08 ASSESSMENT — PAIN SCALES - GENERAL
PAINLEVEL_OUTOF10: 0 - NO PAIN
PAIN_LEVEL: 0

## 2025-05-08 NOTE — H&P
H&P Notes  - documented in this encounter   Henok Bain MD - 04/28/2025 1:35 PM EDT  Formatting of this note is different from the original.  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    The patient has the following:  ACTIVE PROBLEM LIST  Tobacco Use Disorder  Chronic Solar Dermatitis  Right Leg Pain  Htn (Hypertension)  Vascular Bruit  Urinary Frequency  Decreased Pedal Pulses  Snoring  Atherosclerosis of Native Coronary Artery of Native Heart Without Angina Pectoris  Scalp Cyst  Hyperlipidemia  Meibomian Gland Dysfunction (Mgd) of Upper and Lower Lids of Both Eyes  Punctate Keratitis, Bilateral  Hyperopia of Both Eyes With Astigmatism and Presbyopia  CKD (chronic kidney disease) Stage 3, GFR 30-59 ml/min  Elevated Prostate Specific Antigen (Psa)    SUBJECTIVE  CHIEF COMPLAINT: Blurred vision for distance and near Right Eye.    PAST MEDICAL HISTORY  Diagnosis Date  BPH (benign prostatic hyperplasia)  Chronic solar dermatitis 08/14/2012  History of pneumonia 08/14/2012  HTN (hypertension)  Hyperlipidemia  Tobacco use disorder    PAST SURGICAL HISTORY  Procedure Laterality Date  PAST SURGICAL HISTORY OF 05/08/2018  Posterior scalp sebaceous cyst as well as posterior scalp skin tag.  PROSTATE NEEDLE BIOPSY ANY APPROACH 05/24/2019  Transrectal bx, prostate  PROSTATE NEEDLE BIOPSY ANY APPROACH  REMV CATARACT EXTRACAP,INSERT LENS Left 04/03/2025  CCA0T0 +22.5 D  TOOTH EXTRACTION    FAMILY HISTORY  Problem Relation Age of Onset  Hypertension Father  other (unknown) Mother  mother still living as of 4/2018  None Sister  No Family History No Family History  No FH of CaP  No Ocular Disease No Family History    SOCIAL HISTORY:  Social History    Tobacco Use  Smoking status: Every Day  Current packs/day:  0.50  Average packs/day: 0.5 packs/day for 63.3 years (31.7 ttl pk-yrs)  Types: Cigarettes  Start date: 1/1/1962  Smokeless tobacco: Never  Substance Use Topics  Alcohol use: Yes  Alcohol/week: 1.0 standard drink of alcohol  Types: 1 Shots of liquor per week  Drug use: No    MEDICATIONS:  Prior to Admission medications as of 4/28/25 1332  Medication Sig Last Dose Taking  keTORolac (ACULAR) 0.5 % ophthalmic solution Use 1 drop in the left eye four times daily. Yes  prednisoLONE acetate (PRED FORTE) 1 % ophthalmic suspension Use 1 drop in the left eye four times daily. Yes  tamsulosin (FLOMAX) 0.4 mg Take 1 capsule by mouth once daily. Yes  atorvastatin (LIPITOR) 40 mg tablet take 1 tablet by mouth daily Yes  amLODIPine (NORVASC) 5 mg tablet take 1 tablet once daily Yes  aspirin, enteric coated (ASPIRIN, ENTERIC COATED) 81 mg EC tablet Take 81 mg by mouth once daily. Yes    No medication comments found.    CURRENT ALLERGIES: ALLERGIES  No Known Allergies    REVIEW OF SYSTEMS:  PAIN ASSESSMENT:  General: No weight loss, malaise or fevers.  Neuro: No Hx of stroke or seizures  Respiratory: No history of current cough or dyspnea, or pneumonia in the past 6 weeks. No history of respiratory/pulmonary symptoms or problems  Cardiovascular: Positive for: Hypertension, history of heart attack  GI: No history of GI symptoms or problems. No history of esophageal varices, recent ascites, or ETOH greater than 2 drinks per day.  : No history of UTI in past 6 weeks. No history of renal failure. Not currently on or requiring dialysis. No history of  symptoms or problems.  GYN: Negative for abnormal vaginal bleeding, abnormal vaginal discharge., N/A  Pregnancy: N/A  Endocrine: No history of diabetes. Has not taken steroids within the past 30 days. No history of endocrinological symptoms or problems.  Hematology: Chronic anti-coagulation / platelet meds (Aspirin)  Oncology: No history of CA metastasis, chemo within 30 days, or  radiotherapy within 90 days. Has not lost 10% of body wt in 6 months. No history of oncological symptoms or problems.  Psych: No history of psychiatric symptoms or problems.  Musculoskeletal: Negative for joint pain or swelling, back pain or muscle pain.  Skin: Negative for lesions, rash and itching.    PHYSICAL EXAM:  VITALS:  /87  Pulse 97        General: Alert and oriented  Skin: Normal color, no rash, no lesions.  HEENT: EOM, pupils equal, round and reactive.  Cardiovascular: Normal S1 & S2, no rubs, murmurs or gallops. No JVD. Pulse regular.  Lungs: Normal breath sounds, no wheezes or crackles.  Abdomen: Soft, non-tender, no rigidity.  Extremities: No deformity, no edema or tenderness, no joint swelling or clubbing.  Neurological: Normal cognition and motor skills.  Pulses: Carotid and radial pulses normal +2.    Diagnostic tests reviewed for today's visit:  No new labs    ASSESSMENT  Medication and Non-Pharmacologic VTE Prophylaxis/Anticoagulants      VTE Prophylaxis: VTE prophylaxis appropriate    Impression: There is no known pertinent medical condition which may affect fariba-operative course        Clinical Risk Factors for Possible Cardiac Complications:  None    Patient is scheduled for a low-risk procedure.    FUNCTIONAL STATUS: Walk indoors, such as around the house (1.75 METs)    Functional Class (NYHA): N/A    [employer]: Not obtained    PLAN  CONSULTS:  Patient does not require consults for optimization at this time.    The Following Tests/Procedures Have Been Initiated:  None    Instructions Given to Patient:  Patient given verbal and written preop instructions and voices comprehension and compliance.    SIGNATURE: Henok Bain MD PATIENT NAME: Jay Phoenix  DATE: April 28, 2025 MRN: [id number]  TIME: 1:35 PM PAGER/CONTACT #:    Electronically signed by Henok Bain MD at 04/28/2025 2:04 PM EDT   Source Comments  - [facility]   In the event this information is protected by the  Federal Confidentiality of Alcohol and Drug Abuse Patient Records regulations: This information has been disclosed to you from records protected by Federal confidentiality rules (42 CFR Part 2). The Federal rules prohibit you from making any further disclosure of this information unless further disclosure is expressly permitted by the written consent of the person to whom it pertains or as otherwise permitted by 42 CFR Part 2. A general authorization for the release of medical or other information is NOT sufficient for this purpose. The Federal rules restrict any use of the information to criminally investigate or prosecute any alcohol or drug abuse patient.  Reason for Visit    Reason for Visit -  Reason Comments   Blurred Vision Right Eye     Difficulty Reading Right Eye     Glare Right Eye     Encounter Details    Encounter Details  Date Type Department Care Team (Latest Contact Info) Description   04/28/2025 1:15 PM EDT Office Visit [facility] Ophthalmology   [address] [place] [id number]   [phone]  Henok Bain MD   [address] [place] [address]   [phone] (Work)   [phone] (Fax)  Combined forms of age-related cataract of right eye (Primary Dx);  Status post cataract extraction and insertion of intraocular lens of left eye;  Pupillary miosis;  Vitreomacular traction syndrome of left eye     Social History  - documented as of this encounter   Social History  Tobacco Use Types Packs/Day Years Used Date   Smoking Tobacco: Every Day Cigarettes 0.5 63.3 Started: 01/01/1962   Smokeless Tobacco: Never             Social History  Alcohol Use Standard Drinks/Week Comments   Yes 1 (1 standard drink = 0.6 oz pure alcohol)       Social History  PHQ-2 Answer Date Recorded   PHQ-2 score 0 02/06/2023     Social History  Area Deprivation Index Answer Date Recorded   National Score (1-100), lower number is lower risk 50 12/20/2023   State Score (1-10), lower number is lower risk 3 12/20/2023   Data  from: [email or web address]/. 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  Functional Status - Are you deaf or do you have serious difficulty hearing?  Answer Date of Assessment Author   No 11/21/2014 2:32 PM Manju Woodruff LPN        Are you blind or do you have serious difficulty seeing, even when wearing glasses?  Functional Status - Are you blind or do you have serious difficulty seeing, even when wearing glasses?  Answer Date of Assessment Author   No 11/21/2014 2:32 PM Manju Woodruff LPN        Do you have serious difficulty walking or climbing stairs?  Functional Status - Do you have serious difficulty walking or climbing stairs?  Answer Date of Assessment Author   No 11/21/2014 2:32 PM Manju Woodruff LPN        Do you have difficulty dressing or bathing?  Functional Status - Do you have difficulty dressing or bathing?  Answer Date of Assessment Author   No 11/21/2014 2:32 PM Manju Woodruff LPN        Because of a physical, mental, or emotional condition, do you have difficulty doing errands alone such as visiting a  doctor's office or shopping?  Functional Status - Because of a physical, mental, or emotional condition, do you have difficulty doing errands alone such as visiting a doctor's office or shopping?  Answer Date of Assessment Author   No 11/21/2014 2:32 PM Manju Woodruff LPN     Mental Status  - documented in this encounter   Because of a physical, mental, or emotional condition, do you have serious difficulty concentrating, remembering, or making decisions?  Mental Status - Because of a physical, mental, or emotional condition, do you have serious difficulty concentrating, remembering, or making decisions?  Answer Entry Date Author   No 11/21/2014 2:32 PM Manju Woodruff LPN     Patient Instructions  - documented in this encounter   Patient Instructions  Henok Bain MD - 04/28/2025 1:35 PM EDT   Formatting of this note is different from the original.  Plan Cataract Surgery with Monofocal Intraocular Lens Implant Right Eye on 05/08/2025 at [facility].    Current Ophthalmic Meds      keTORolac (ACULAR) 0.5 % ophthalmic solution Use 1 drop in the left eye three times daily.  prednisoLONE acetate (PRED FORTE) 1 % ophthalmic suspension Use 1 drop in the left eye three times daily.    Continue:  Systane Complete solution instill 1 drop 3 times daily Both Eyes.    If you have any questions please contact our office at [phone].  After office hours or on the weekend, please call Dr. Bain on his cell phone at [phone].    Electronically signed by Henok Bain MD at 04/28/2025 1:35 PM EDT     Progress Notes  - documented in this encounter   Henok Bain MD - 04/28/2025 1:32 PM EDT  Formatting of this note is different from the original.  ASSESSMENT/PLAN:  1. Combined forms of age-related cataract of right eye - ICD9: 366.19, ICD10: H25.811 (primary diagnosis)    Cataract Presurgical Documentation    Cataract: Right Eye    Current Visual  Acuity  Right Eye Distance CC 20/70  Left Eye Distance SC 20/25      Visual Function: Jay Phoenix states that the decline in vision from the cataract impedes his abilities as listed in the HPI, as well as other activities of daily living.    Jay Phoenix has confirmed that he is no longer able to function adequately on a day-to-day basis because of his current visual condition.    Further, it is my medical opinion that the cataract is the primary cause, or at least a significantly contributory cause of his visual dysfunction. With uncomplicated cataract surgery and lens implantation, it is my expectation that his visual function and quality of life will improve, significantly.    The risks, benefits, alternatives, personnel and complications of cataract surgery with lens implantation were discussed with Jay Phoenix in detail. He appeared to understand and asked that I proceed with plans for surgery.    Upon eye examination, patient was found to have a visually significant cataract Right Eye. Discussed cataract surgery with patient and different intraocular lens implant options with patient: basic monofocal intraocular lens implant, Toric intraocular lens implant, and presbyopia correction intraocular lens implant. In my medical opinion, based on medical history and ocular examination, cataract surgery with intraocular lens implant will correct patient's vision and improve quality of patient's daily living activities. Patient wishes to have traditional cataract surgery with basic intraocular lens Right Eye. Patient wishes to have cataract surgery with the option stated above. Patient understands that an intraocular lens implant does not necessarily replace the need for glasses. Patient understands that it is impossible for the surgeon to inform him/her of every possible complication that may occur. The surgeon has answered all of the patient's questions. Patient understands that if he/she has a mature or  dense cataract, pseudoexfoliation cataract, or history of use of Flomax, he/she may require the use of Maluyugin Ring and/or Vision Blue during surgery. Patient understands the risks, benefits, and alternatives to surgery.    Plan Cataract Surgery with Monofocal Intraocular Lens Implant Right Eye on 05/08/2025 at [facility].    Continue:  Systane Complete solution instill 1 drop 3 times daily Both Eyes.    PHYSICAL EXAM:    Vital Signs:  Blood pressure 166/87, pulse 97.    Respiratory:  Normal breath sounds, no wheezing.    CARD:  Normal heart sounds 1 & 2, normal sinus rhythm.    2. Status post cataract extraction and insertion of intraocular lens of left eye - ICD9: V45.61, V43.1, ICD10: Z98.42, Z96.1    Current Ophthalmic Meds      keTORolac (ACULAR) 0.5 % ophthalmic solution Use 1 drop in the left eye three times daily.  prednisoLONE acetate (PRED FORTE) 1 % ophthalmic suspension Use 1 drop in the left eye three times daily.    Continue:  Systane Complete solution instill 1 drop 3 times daily Both Eyes.    3. 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    Patient understands that with history of use of Flomax, he may require the use of Maluyugin Ring and/or Vision Blue during surgery.    4. Vitreomacular traction syndrome of left eye - ICD9: 379.27, ICD10: H43.822    Monitor    I have confirmed and edited as necessary the relevant HPI, ophthalmic history, ROS, and the neuro exam findings as obtained by others. I have seen and examined Jay Phoenix. I have discussed the case and the management of this patient's care with the Resident/Fellow, if applicable. I also have reviewed and agree with the assessment and plan as stated above and agree with all of its relevant components.        Electronically signed by Henok Bain MD at 04/28/2025 2:04 PM EDT   Plan of Treatment  - documented as of this encounter   Plan of Treatment - Upcoming  Encounters  Upcoming Encounters  Date Type Department Care Team (Latest Contact Info) Description   05/09/2025 9:15 AM EDT Office Visit [facility] Ophthalmology   [address] [place] [address]   [phone]  Henok Bain MD   [address] [place] [address]   [phone] (Work)   [phone] (Fax)  1 day po 2nd re basic     Plan of Treatment - Scheduled Procedures  Scheduled Procedures  Name Priority Associated Diagnoses Date/Time   SURGERY AT NON-[facility] FACILITY   Combined forms of age-related cataract of right eye  05/08/2025 1:15 PM EDT     Procedures  - documented in this encounter   Procedures  Procedure Name Priority Date/Time Associated Diagnosis Comments   ASCAN ONLY - DIAGNOSTIC OD (RIGHT EYE) Routine 04/28/2025 1:39 PM EDT Combined forms of age-related cataract of right eye  Results for this procedure are in the results section.      Imaging Results  - documented in this encounter   ASCAN ONLY - DIAGNOSTIC OD (RIGHT EYE) (04/28/2025 1:39 PM EDT)  Imaging Results - ASCAN ONLY - DIAGNOSTIC OD (RIGHT EYE) (04/28/2025 1:39 PM EDT)  Anatomical Region Laterality Modality       Other     Imaging Results - ASCAN ONLY - DIAGNOSTIC OD (RIGHT EYE) (04/28/2025 1:39 PM EDT)  Narrative   04/28/2025 1:39 PM EDT   Date of Procedure  4/28/2025.       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    Vitreomacular traction syndrome of left eye      Eye Exam    Eye Exam - Visual  Acuity  Visual Acuity    Right eye Left eye   Dist sc   20/25   Dist cc 20/70     Near cc J8       Eye Exam - Tonometry (Applanation, 2:04 PM)  Tonometry (Applanation, 2:04 PM)    Right eye Left eye   Pressure 15 15     Eye Exam - Pupils  Pupils    Pupils Dark Light Shape APD   Right eye PERRL 4 2 Round None   Left eye PERRL 4 2 Round None     Eye Exam - Visual Fields (Counting fingers)  Visual Fields (Counting fingers)    Right eye Left eye     Full Full     Eye Exam - Extraocular Movement  Extraocular Movement    Right eye Left eye     Full Full     Eye Exam - Neuro/Psych  Neuro/Psych  Oriented x3: Yes   Mood/Affect: Normal     Eye Exam - External Exam  External Exam    Right eye Left eye   External Normal including orbits and preauricular lymph nodes Normal including orbits and preauricular lymph nodes     Eye Exam - Slit Lamp Exam  Slit Lamp Exam    Right eye Left eye   Lids/Lashes Normal lids, lashes, lacrimal glands, and lacrimal drainage Pigmented lesion lower lid   Conjunctiva/Sclera White and quiet White and quiet   Cornea Normal epithelium, stroma, endothelium, and tear film Normal epithelium, stroma, endothelium, and tear film   Anterior Chamber Deep and quiet Deep and quiet   Iris Round and reactive Round and reactive   Lens 3+ Nuclear sclerotic cataract, 3+ Posterior subcapsular cataract Posterior chamber intraocular lens   Anterior Vitreous Normal Normal     Eye Exam - Fundus Exam  Fundus Exam    Right eye Left eye   Disc Normal Normal   C/D Ratio 0.2 0.2   Macula Normal Normal   Vessels Normal Normal   Periphery Normal Normal     Eye Exam - Wearing Rx  Wearing Rx    Sphere Cylinder Axis   Right eye +0.50 +0.50 010   Left eye           Care Teams  - documented as of this encounter   Care Teams  Team Member Relationship Specialty Start Date End Date   Kinza Yadav NP   NPI: [id number]   [address] [place], [place] [id number] [phone] (Work)   [phone] (Fax)  PCP - General Family  Medicine 2/7/25     Víctor Castillo V, MD   NPI: [id number]   [address] [place] [address]   [phone] (Work)   [phone] (Fax)  Primary Staff Physician Cardiology 3/19/19

## 2025-05-08 NOTE — ANESTHESIA POSTPROCEDURE EVALUATION
Patient: Jay Phoenix    Procedure Summary       Date: 05/08/25 Room / Location: [facility] OR 05 / Virtual [facility] OR    Anesthesia Start: 1305 Anesthesia Stop: 1329    Procedure: PHACOEMULSIFICATION, CATARACT, WITH IOL INSERTION (Right: Eye) Diagnosis:       Combined forms of age-related cataract of right eye      (Combined forms of age-related cataract of right eye [H25.811])    Surgeons: Henok Bain MD Responsible Provider: Richard Yip DO    Anesthesia Type: MAC ASA Status: 3            Anesthesia Type: MAC    Vitals Value Taken Time   /65 05/08/25 13:29   Temp 97.0 05/08/25 13:29   Pulse 65 05/08/25 13:29   Resp 17 05/08/25 13:29   SpO2 97 RA 05/08/25 13:29       Anesthesia Post Evaluation    Patient location during evaluation: bedside  Patient participation: complete - patient participated  Level of consciousness: awake  Pain score: 0  Pain management: adequate  Multimodal analgesia pain management approach  Airway patency: patent  Cardiovascular status: acceptable  Respiratory status: acceptable and room air  Hydration status: acceptable  Postoperative Nausea and Vomiting: none  Comments: Easily awakens.  VSS.  Denies pain or nausea.        No notable events documented.

## 2025-05-08 NOTE — OP NOTE
PHACOEMULSIFICATION, CATARACT, WITH IOL INSERTION (R) Operative Note     Date: 2025  OR Location: [facility] OR    Name: Jay Phoenix : 1945, Age: 80 y.o., MRN: [id number], Sex: male    Diagnosis  Pre-op Diagnosis      * Combined forms of age-related cataract of right eye [H25.811] Post-op Diagnosis     * Combined forms of age-related cataract of right eye [H25.811]     Procedures  PHACOEMULSIFICATION, CATARACT, WITH IOL INSERTION  61154 - [place] XCAPSL CTRC RMVL INSJ IO LENS PROSTH W/O ECP      Surgeons      * Henok Bain - Primary    Resident/Fellow/Other Assistant:  Surgeons and Role:  * No surgeons found with a matching role *    Staff:   Circulator: Karolina Fay Person: Nicola    Anesthesia Staff: Anesthesiologist: Richard Yip DO    Procedure Summary  Anesthesia: Monitor Anesthesia Care  ASA: III  Estimated Blood Loss: None  Intra-op Medications:   Administrations occurring from 1240 to 1320 on 25:   Medication Name Total Dose   lidocaine 1%-phenylephrine 1.5% intravitreal injection 2 mL   moxifloxacin (Vigamox) 1.5 mg/1 mL (0.15%) injection 1.5 mg 1.5 mg   prednisoLONE acetate (Pred-Forte) 1 % ophthalmic suspension 1 drop 1 drop   midazolam (Versed) injection 1 mg 1 mg   midazolam PF (Versed) injection 1 mg/mL 0.75 mg     Anesthesia Record        Intraprocedure I/O Totals          Intake    lactated Ringer's infusion 300.00 mL    Total Intake 300 mL     Specimen: No specimens collected     Drains and/or Catheters: * None in log *    I have reviewed the images and report from the Ophthalmic Biometry 25 to determine the intraocular lens power calculation for the IOL lens implant.  I have interpreted and agree with the calculation of the IOL as listed below.    Implants:  Implants       Type Name Action Serial No.      Lens LENS, INTRAOCULAR, CCA0T0 22.0 - [id number] - [id number] Implanted [id number]      Findings: Combined Form Age Related Cataract Right Eye     Indications: Jay CUMMINS  Alban is an 80 y.o. male who is having surgery for Combined forms of age-related cataract of right eye [H25.811]. Blurred vision right eye for near and distance, trouble seeing to read and watch TV right eye.  Vision right eye impedes patients ability to perform daily activities.    The patient was seen in the preoperative area. The risks, benefits, complications, treatment options, non-operative alternatives, expected recovery and outcomes were discussed with the patient. The possibilities of reaction to medication, pulmonary aspiration, injury to surrounding structures, bleeding, recurrent infection, the need for additional procedures, failure to diagnose a condition, and creating a complication requiring transfusion or operation were discussed with the patient. The patient concurred with the proposed plan, giving informed consent.  The site of surgery was properly noted/marked if necessary per policy. The patient has been actively warmed in preoperative area. Preoperative antibiotics are not indicated. Venous thrombosis prophylaxis are not indicated.    Procedure Details: The patient was correctly identified and the patient's operative eye was marked with a marking pen and verified with the patient in the pre-operative area.   The operative eye was dilated in the preoperative area.  The patient was then taken to the operating room where timeout was performed before starting the procedure. Combined anesthesia  with intravenous sedation and topical tetracaine eyedrops were instilled into the right eye. The operative eye  was prepped and draped in the standard sterile ophthalmic fashion in  preparation for ophthalmic surgery.  A [place] wire speculum was then inserted between the eyelids of the right eye and the operating microscope was placed over the right eye.  A paracentesis incision was made approximately 30° away from the planned surgical incision site with the help of MVR blade.  1% lidocaine MPF with  Phenylephrine 1.5% PF was injected into the anterior chamber through the paracentesis incision. A near limbal clear corneal incision was fashioned in the temporal quadrant just outside the vascular arcade and Viscoat was injected into anterior chamber to firm the eye. A bent needle cystotome was used and Utrata forceps were utilized to create a continuous curvilinear capsulorrhexis.  BSS was injected beneath the anterior capsule to hydrodissect the nucleus from adjacent cortex and capsule.  The residual cortex was then aspirated with irrigation/aspiration handpiece. The posterior capsule was then polished with the help of soft irrigation-aspiration tip.  Provisc viscoelastic was then injected into the eye to reform the anterior chamber and to open the capsular bag.  The intraocular lens implant was taken from its sterile wrapping, inspected under the surgical microscope and found to be in good condition. The intraocular lens implant +22.0D was injected into the capsule bag. The Provisc was then aspirated from the anterior chamber and from behind the intraocular lens implant.  The anterior chamber was inflated with the help of BSS to moderate tension.  And the edges of the surgical incision were then hydrated with the help of BSS.  Vigamox was then injected into the anterior chamber and into the capsule bag through the paracentesis incision. The surgical wound was then inspected and found to be watertight.  The wire speculum and drapes were then removed.  Pred Forte eyedrops, Acular eyedrops and Betadine 5% sterile ophthalmic solution were instilled in the conjunctival sac.     The patient tolerated the procedure well and was taken to recovery room in stable condition.    Evidence of Infection: No   Complications:  None; patient tolerated the procedure well.    Disposition: Home  Condition: stable     Attending Attestation: I performed the procedure.    Patient will be co-managed with his Optometrist    Henok LUCERO  [place]  Phone Number: [phone]

## 2025-05-08 NOTE — ANESTHESIA PREPROCEDURE EVALUATION
Patient: Jay Phoenix    Procedure Information       Date/Time: 05/08/25 1240    Procedure: PHACOEMULSIFICATION, CATARACT, WITH IOL INSERTION (Right: Eye)    Location: [facility] OR 05 / Virtual [facility] OR    Surgeons: Henok Bain MD            Relevant Problems   Anesthesia   (+) PONV (postoperative nausea and vomiting)   (-) Family history of malignant hyperthermia   (-) Malignant hyperthermia      Cardiac   (+) CAD (coronary artery disease)   (+) HTN (hypertension)   (+) MI (myocardial infarction) (Multi)      Pulmonary  Smoker 1/2 ppd      Neuro (within normal limits)      GI (within normal limits)      /Renal  Elevated PSA's.  Followed by Dr. Lewis   (+) BPH (benign prostatic hyperplasia)      Liver (within normal limits)      Endocrine (within normal limits)      Hematology (within normal limits)      Musculoskeletal (within normal limits)      HEENT   (+) Cataract present     Tolerated prior cataract procedure well.  Comfortable for it.  Prior anesthesia record reviewed.  Clinical information reviewed:   Tobacco  Allergies  Meds   Med Hx  Surg Hx   Fam Hx  Soc Hx        NPO Detail:  NPO/Void Status  Carbohydrate Drink Given Prior to Surgery? : N  Date of Last Liquid: 05/07/25  Time of Last Liquid: 2100  Date of Last Solid: 05/07/25  Time of Last Solid: 2100  Last Intake Type: Clear fluids  Time of Last Void: 0900         Physical Exam    Airway  Mallampati: II  TM distance: >3 FB  Neck ROM: full  Mouth opening: 3 or more finger widths     Cardiovascular   Rhythm: regular  Rate: normal     Dental     (+) lower dentures, upper dentures     Pulmonary - normal exam   Abdominal            Anesthesia Plan    History of general anesthesia?: yes  History of complications of general anesthesia?: no    ASA 3     MAC     The patient is a current smoker.  Patient did not smoke on day of procedure.    intravenous induction   Anesthetic plan and risks discussed with patient.    MAC discussed with Patient.  Plan and  process discussed for anesthesia for procedure.  Risks and benefits discussed.  Need for cooperation with the surgeon for the possible block per surgeon and procedure. All questions answered with patient.  The patient understands plan and wishes to proceed.

## 2025-05-28 ENCOUNTER — TELEPHONE (OUTPATIENT)
Age: 80
End: 2025-05-28

## 2025-05-28 DIAGNOSIS — R97.20 ELEVATED PSA: Primary | ICD-10-CM

## 2025-06-10 DIAGNOSIS — R97.20 ELEVATED PSA: ICD-10-CM

## 2025-06-10 LAB — PSA SERPL-MCNC: 12.13 NG/ML (ref 0–4)

## 2025-06-12 ENCOUNTER — OFFICE VISIT (OUTPATIENT)
Age: 80
End: 2025-06-12
Payer: MEDICARE

## 2025-06-12 VITALS
HEIGHT: 63 IN | SYSTOLIC BLOOD PRESSURE: 132 MMHG | DIASTOLIC BLOOD PRESSURE: 70 MMHG | WEIGHT: 145 LBS | HEART RATE: 98 BPM | BODY MASS INDEX: 25.69 KG/M2

## 2025-06-12 DIAGNOSIS — R97.20 ELEVATED PSA: Primary | ICD-10-CM

## 2025-06-12 PROCEDURE — 1159F MED LIST DOCD IN RCRD: CPT | Performed by: UROLOGY

## 2025-06-12 PROCEDURE — G8427 DOCREV CUR MEDS BY ELIG CLIN: HCPCS | Performed by: UROLOGY

## 2025-06-12 PROCEDURE — 1123F ACP DISCUSS/DSCN MKR DOCD: CPT | Performed by: UROLOGY

## 2025-06-12 PROCEDURE — 99212 OFFICE O/P EST SF 10 MIN: CPT | Performed by: UROLOGY

## 2025-06-12 PROCEDURE — 4004F PT TOBACCO SCREEN RCVD TLK: CPT | Performed by: UROLOGY

## 2025-06-12 PROCEDURE — G8417 CALC BMI ABV UP PARAM F/U: HCPCS | Performed by: UROLOGY

## 2025-06-12 PROCEDURE — 3078F DIAST BP <80 MM HG: CPT | Performed by: UROLOGY

## 2025-06-12 PROCEDURE — 99213 OFFICE O/P EST LOW 20 MIN: CPT | Performed by: UROLOGY

## 2025-06-12 PROCEDURE — 3075F SYST BP GE 130 - 139MM HG: CPT | Performed by: UROLOGY

## 2025-06-12 PROCEDURE — 1160F RVW MEDS BY RX/DR IN RCRD: CPT | Performed by: UROLOGY

## 2025-06-12 ASSESSMENT — ENCOUNTER SYMPTOMS
ABDOMINAL PAIN: 0
ABDOMINAL DISTENTION: 0

## 2025-06-12 NOTE — PROGRESS NOTES
diagnosis there can be risk for progression and increased morbidity and mortality. He agrees to continue closer PSA follow-up.                Plan:      F/U 6 mo for PSA (pt choice)        Homero Flores MD

## 2025-08-25 ENCOUNTER — OFFICE VISIT (OUTPATIENT)
Dept: INTERNAL MEDICINE | Age: 80
End: 2025-08-25

## 2025-08-25 VITALS
HEART RATE: 79 BPM | HEIGHT: 63 IN | WEIGHT: 153.6 LBS | BODY MASS INDEX: 27.21 KG/M2 | RESPIRATION RATE: 16 BRPM | OXYGEN SATURATION: 97 % | DIASTOLIC BLOOD PRESSURE: 80 MMHG | SYSTOLIC BLOOD PRESSURE: 162 MMHG

## 2025-08-25 DIAGNOSIS — I10 PRIMARY HYPERTENSION: Primary | ICD-10-CM

## 2025-08-25 DIAGNOSIS — R00.0 TACHYCARDIA: ICD-10-CM

## 2025-08-25 DIAGNOSIS — I25.10 ATHEROSCLEROSIS OF NATIVE CORONARY ARTERY OF NATIVE HEART WITHOUT ANGINA PECTORIS: ICD-10-CM

## 2025-08-25 RX ORDER — METOPROLOL SUCCINATE 25 MG/1
25 TABLET, EXTENDED RELEASE ORAL DAILY
Qty: 90 TABLET | Refills: 2 | Status: SHIPPED | OUTPATIENT
Start: 2025-08-25

## (undated) DEVICE — Device